# Patient Record
Sex: MALE | Race: WHITE | Employment: FULL TIME | URBAN - METROPOLITAN AREA
[De-identification: names, ages, dates, MRNs, and addresses within clinical notes are randomized per-mention and may not be internally consistent; named-entity substitution may affect disease eponyms.]

---

## 2017-01-07 ENCOUNTER — HOSPITAL ENCOUNTER (EMERGENCY)
Age: 22
Discharge: HOME | End: 2017-01-07
Attending: EMERGENCY MEDICINE
Payer: COMMERCIAL

## 2017-01-07 ENCOUNTER — APPOINTMENT (OUTPATIENT)
Dept: GENERAL RADIOLOGY | Age: 22
End: 2017-01-07
Payer: COMMERCIAL

## 2017-01-07 VITALS
BODY MASS INDEX: 20.09 KG/M2 | OXYGEN SATURATION: 100 % | HEART RATE: 60 BPM | RESPIRATION RATE: 16 BRPM | WEIGHT: 140 LBS | TEMPERATURE: 97.7 F | DIASTOLIC BLOOD PRESSURE: 77 MMHG | SYSTOLIC BLOOD PRESSURE: 111 MMHG

## 2017-01-07 DIAGNOSIS — M54.6 PAIN IN THORACIC SPINE: Primary | ICD-10-CM

## 2017-01-07 LAB — D DIMER: <0.19 MG/L FEU (ref 0–0.5)

## 2017-01-07 PROCEDURE — 71020 XR CHEST STANDARD TWO VW: CPT

## 2017-01-07 PROCEDURE — 36415 COLL VENOUS BLD VENIPUNCTURE: CPT

## 2017-01-07 PROCEDURE — 85379 FIBRIN DEGRADATION QUANT: CPT

## 2017-01-07 PROCEDURE — 99285 EMERGENCY DEPT VISIT HI MDM: CPT

## 2017-01-07 PROCEDURE — 93005 ELECTROCARDIOGRAM TRACING: CPT

## 2017-01-07 RX ORDER — METHOCARBAMOL 750 MG/1
TABLET, FILM COATED ORAL
Qty: 25 TABLET | Refills: 0 | Status: SHIPPED | OUTPATIENT
Start: 2017-01-07 | End: 2017-01-13

## 2017-01-07 RX ORDER — ETODOLAC 400 MG/1
TABLET, FILM COATED ORAL
Qty: 25 TABLET | Refills: 0 | Status: SHIPPED | OUTPATIENT
Start: 2017-01-07 | End: 2017-10-06

## 2017-01-07 ASSESSMENT — ENCOUNTER SYMPTOMS
DIARRHEA: 0
VOICE CHANGE: 0
EYE ITCHING: 0
ANAL BLEEDING: 0
EYE DISCHARGE: 0
FACIAL SWELLING: 0
VOMITING: 0
PHOTOPHOBIA: 0
STRIDOR: 0
CHEST TIGHTNESS: 0
ABDOMINAL PAIN: 0
EYE PAIN: 0
BLOOD IN STOOL: 0
ABDOMINAL DISTENTION: 0
BACK PAIN: 0
SINUS PRESSURE: 0
SHORTNESS OF BREATH: 0
COUGH: 0
CONSTIPATION: 0
EYE REDNESS: 0
SORE THROAT: 0
COLOR CHANGE: 0
CHOKING: 0
WHEEZING: 0
TROUBLE SWALLOWING: 0
RHINORRHEA: 0
NAUSEA: 0

## 2017-01-07 ASSESSMENT — PAIN DESCRIPTION - LOCATION: LOCATION: BACK

## 2017-01-07 ASSESSMENT — PAIN DESCRIPTION - PAIN TYPE: TYPE: ACUTE PAIN

## 2017-01-07 ASSESSMENT — PAIN DESCRIPTION - FREQUENCY: FREQUENCY: CONTINUOUS

## 2017-01-07 ASSESSMENT — PAIN SCALES - GENERAL: PAINLEVEL_OUTOF10: 7

## 2017-01-09 LAB
EKG ATRIAL RATE: 72 BPM
EKG P AXIS: 73 DEGREES
EKG P-R INTERVAL: 136 MS
EKG Q-T INTERVAL: 362 MS
EKG QRS DURATION: 88 MS
EKG QTC CALCULATION (BAZETT): 396 MS
EKG R AXIS: 90 DEGREES
EKG T AXIS: 57 DEGREES
EKG VENTRICULAR RATE: 72 BPM

## 2017-01-13 ENCOUNTER — OFFICE VISIT (OUTPATIENT)
Dept: FAMILY MEDICINE CLINIC | Age: 22
End: 2017-01-13

## 2017-01-13 VITALS
DIASTOLIC BLOOD PRESSURE: 74 MMHG | SYSTOLIC BLOOD PRESSURE: 128 MMHG | HEART RATE: 63 BPM | BODY MASS INDEX: 20.9 KG/M2 | OXYGEN SATURATION: 98 % | WEIGHT: 145.98 LBS | HEIGHT: 70 IN

## 2017-01-13 DIAGNOSIS — M54.6 CHRONIC MIDLINE THORACIC BACK PAIN: ICD-10-CM

## 2017-01-13 DIAGNOSIS — G44.84 EXERTIONAL HEADACHE: ICD-10-CM

## 2017-01-13 DIAGNOSIS — K21.9 GASTROESOPHAGEAL REFLUX DISEASE, ESOPHAGITIS PRESENCE NOT SPECIFIED: ICD-10-CM

## 2017-01-13 DIAGNOSIS — I47.1 SVT (SUPRAVENTRICULAR TACHYCARDIA) (HCC): ICD-10-CM

## 2017-01-13 DIAGNOSIS — R07.89 ATYPICAL CHEST PAIN: Primary | ICD-10-CM

## 2017-01-13 DIAGNOSIS — G89.29 CHRONIC MIDLINE THORACIC BACK PAIN: ICD-10-CM

## 2017-01-13 PROCEDURE — 99213 OFFICE O/P EST LOW 20 MIN: CPT | Performed by: FAMILY MEDICINE

## 2017-01-13 RX ORDER — FAMOTIDINE 20 MG/1
20 TABLET, FILM COATED ORAL 2 TIMES DAILY
Qty: 60 TABLET | Refills: 3 | Status: SHIPPED | OUTPATIENT
Start: 2017-01-13 | End: 2017-10-06

## 2017-02-06 ENCOUNTER — OFFICE VISIT (OUTPATIENT)
Dept: FAMILY MEDICINE CLINIC | Age: 22
End: 2017-02-06

## 2017-02-06 VITALS
OXYGEN SATURATION: 97 % | TEMPERATURE: 97 F | WEIGHT: 140.8 LBS | DIASTOLIC BLOOD PRESSURE: 60 MMHG | HEART RATE: 115 BPM | SYSTOLIC BLOOD PRESSURE: 110 MMHG | HEIGHT: 70 IN | BODY MASS INDEX: 20.16 KG/M2

## 2017-02-06 DIAGNOSIS — J40 BRONCHITIS: Primary | ICD-10-CM

## 2017-02-06 PROCEDURE — 99213 OFFICE O/P EST LOW 20 MIN: CPT | Performed by: NURSE PRACTITIONER

## 2017-02-06 RX ORDER — ASPIRIN/CALCIUM/MAG/ALUMINUM 325 MG
TABLET ORAL
Status: ON HOLD | COMMUNITY
End: 2017-09-23 | Stop reason: HOSPADM

## 2017-02-06 RX ORDER — AZITHROMYCIN 250 MG/1
TABLET, FILM COATED ORAL
Qty: 1 PACKET | Refills: 0 | Status: SHIPPED | OUTPATIENT
Start: 2017-02-06 | End: 2017-02-16

## 2017-02-06 RX ORDER — BENZONATATE 100 MG/1
100 CAPSULE ORAL 3 TIMES DAILY PRN
Qty: 21 CAPSULE | Refills: 0 | Status: SHIPPED | OUTPATIENT
Start: 2017-02-06 | End: 2017-02-13

## 2017-02-06 ASSESSMENT — ENCOUNTER SYMPTOMS
NAUSEA: 1
SWOLLEN GLANDS: 1
COUGH: 1
ABDOMINAL PAIN: 1
WHEEZING: 1
SORE THROAT: 1
SINUS PRESSURE: 1
SINUS PAIN: 0
RHINORRHEA: 1
VOMITING: 0
DIARRHEA: 0

## 2017-04-25 ENCOUNTER — OFFICE VISIT (OUTPATIENT)
Dept: FAMILY MEDICINE CLINIC | Age: 22
End: 2017-04-25

## 2017-04-25 VITALS
HEART RATE: 95 BPM | OXYGEN SATURATION: 97 % | WEIGHT: 144 LBS | HEIGHT: 70 IN | BODY MASS INDEX: 20.62 KG/M2 | DIASTOLIC BLOOD PRESSURE: 78 MMHG | SYSTOLIC BLOOD PRESSURE: 116 MMHG

## 2017-04-25 DIAGNOSIS — S90.31XA CONTUSION OF RIGHT FOOT, INITIAL ENCOUNTER: Primary | ICD-10-CM

## 2017-04-25 PROCEDURE — 99213 OFFICE O/P EST LOW 20 MIN: CPT | Performed by: FAMILY MEDICINE

## 2017-04-25 ASSESSMENT — PATIENT HEALTH QUESTIONNAIRE - PHQ9
SUM OF ALL RESPONSES TO PHQ QUESTIONS 1-9: 0
2. FEELING DOWN, DEPRESSED OR HOPELESS: 0
1. LITTLE INTEREST OR PLEASURE IN DOING THINGS: 0
SUM OF ALL RESPONSES TO PHQ9 QUESTIONS 1 & 2: 0

## 2017-09-21 ENCOUNTER — APPOINTMENT (OUTPATIENT)
Dept: GENERAL RADIOLOGY | Age: 22
End: 2017-09-21
Payer: COMMERCIAL

## 2017-09-21 ENCOUNTER — HOSPITAL ENCOUNTER (EMERGENCY)
Age: 22
Discharge: HOME | End: 2017-09-21
Payer: COMMERCIAL

## 2017-09-21 ENCOUNTER — APPOINTMENT (OUTPATIENT)
Dept: CT IMAGING | Age: 22
End: 2017-09-21
Payer: COMMERCIAL

## 2017-09-21 VITALS
SYSTOLIC BLOOD PRESSURE: 130 MMHG | TEMPERATURE: 98.9 F | RESPIRATION RATE: 20 BRPM | BODY MASS INDEX: 20.04 KG/M2 | HEIGHT: 70 IN | DIASTOLIC BLOOD PRESSURE: 82 MMHG | WEIGHT: 140 LBS | HEART RATE: 112 BPM | OXYGEN SATURATION: 96 %

## 2017-09-21 DIAGNOSIS — R10.30 POSTOPERATIVE LOWER ABDOMINAL PAIN: Primary | ICD-10-CM

## 2017-09-21 DIAGNOSIS — G89.18 POSTOPERATIVE LOWER ABDOMINAL PAIN: Primary | ICD-10-CM

## 2017-09-21 LAB
ALBUMIN SERPL-MCNC: 4.8 G/DL (ref 3.9–4.9)
ALP BLD-CCNC: 60 U/L (ref 35–104)
ALT SERPL-CCNC: 13 U/L (ref 0–41)
AMPHETAMINE SCREEN, URINE: ABNORMAL
ANION GAP SERPL CALCULATED.3IONS-SCNC: 16 MEQ/L (ref 7–13)
AST SERPL-CCNC: 14 U/L (ref 0–40)
BARBITURATE SCREEN URINE: ABNORMAL
BASOPHILS ABSOLUTE: 0.1 K/UL (ref 0–0.2)
BASOPHILS RELATIVE PERCENT: 0.5 %
BENZODIAZEPINE SCREEN, URINE: ABNORMAL
BILIRUB SERPL-MCNC: 0.5 MG/DL (ref 0–1.2)
BILIRUBIN URINE: NEGATIVE
BLOOD, URINE: NEGATIVE
BUN BLDV-MCNC: 11 MG/DL (ref 6–20)
CALCIUM SERPL-MCNC: 9.5 MG/DL (ref 8.6–10.2)
CANNABINOID SCREEN URINE: ABNORMAL
CHLORIDE BLD-SCNC: 98 MEQ/L (ref 98–107)
CLARITY: CLEAR
CO2: 24 MEQ/L (ref 22–29)
COCAINE METABOLITE SCREEN URINE: ABNORMAL
COLOR: YELLOW
CREAT SERPL-MCNC: 0.96 MG/DL (ref 0.7–1.2)
EOSINOPHILS ABSOLUTE: 0.5 K/UL (ref 0–0.7)
EOSINOPHILS RELATIVE PERCENT: 3.8 %
GFR AFRICAN AMERICAN: >60
GFR NON-AFRICAN AMERICAN: >60
GLOBULIN: 3 G/DL (ref 2.3–3.5)
GLUCOSE BLD-MCNC: 96 MG/DL (ref 74–109)
GLUCOSE URINE: NEGATIVE MG/DL
HCT VFR BLD CALC: 46.6 % (ref 42–52)
HEMOGLOBIN: 15.6 G/DL (ref 14–18)
KETONES, URINE: NEGATIVE MG/DL
LEUKOCYTE ESTERASE, URINE: NEGATIVE
LYMPHOCYTES ABSOLUTE: 2.7 K/UL (ref 1–4.8)
LYMPHOCYTES RELATIVE PERCENT: 22.3 %
Lab: ABNORMAL
MCH RBC QN AUTO: 30.4 PG (ref 27–31.3)
MCHC RBC AUTO-ENTMCNC: 33.4 % (ref 33–37)
MCV RBC AUTO: 91 FL (ref 80–100)
MONOCYTES ABSOLUTE: 1.4 K/UL (ref 0.2–0.8)
MONOCYTES RELATIVE PERCENT: 11.2 %
NEUTROPHILS ABSOLUTE: 7.6 K/UL (ref 1.4–6.5)
NEUTROPHILS RELATIVE PERCENT: 62.2 %
NITRITE, URINE: NEGATIVE
OPIATE SCREEN URINE: POSITIVE
PDW BLD-RTO: 13.1 % (ref 11.5–14.5)
PH UA: 8 (ref 5–9)
PHENCYCLIDINE SCREEN URINE: ABNORMAL
PLATELET # BLD: 295 K/UL (ref 130–400)
POTASSIUM SERPL-SCNC: 4 MEQ/L (ref 3.5–5.1)
PROTEIN UA: NEGATIVE MG/DL
RBC # BLD: 5.12 M/UL (ref 4.7–6.1)
SODIUM BLD-SCNC: 138 MEQ/L (ref 132–144)
SPECIFIC GRAVITY UA: 1 (ref 1–1.03)
TOTAL PROTEIN: 7.8 G/DL (ref 6.4–8.1)
URINE REFLEX TO CULTURE: NORMAL
UROBILINOGEN, URINE: 0.2 E.U./DL
WBC # BLD: 12.2 K/UL (ref 4.5–11)

## 2017-09-21 PROCEDURE — 2580000003 HC RX 258: Performed by: PERSONAL EMERGENCY RESPONSE ATTENDANT

## 2017-09-21 PROCEDURE — 80053 COMPREHEN METABOLIC PANEL: CPT

## 2017-09-21 PROCEDURE — 74176 CT ABD & PELVIS W/O CONTRAST: CPT

## 2017-09-21 PROCEDURE — 99284 EMERGENCY DEPT VISIT MOD MDM: CPT

## 2017-09-21 PROCEDURE — 85025 COMPLETE CBC W/AUTO DIFF WBC: CPT

## 2017-09-21 PROCEDURE — 71010 XR CHEST PORTABLE: CPT

## 2017-09-21 PROCEDURE — 81003 URINALYSIS AUTO W/O SCOPE: CPT

## 2017-09-21 PROCEDURE — 96375 TX/PRO/DX INJ NEW DRUG ADDON: CPT

## 2017-09-21 PROCEDURE — 36415 COLL VENOUS BLD VENIPUNCTURE: CPT

## 2017-09-21 PROCEDURE — 96374 THER/PROPH/DIAG INJ IV PUSH: CPT

## 2017-09-21 PROCEDURE — 93005 ELECTROCARDIOGRAM TRACING: CPT

## 2017-09-21 PROCEDURE — 80307 DRUG TEST PRSMV CHEM ANLYZR: CPT

## 2017-09-21 PROCEDURE — 6360000002 HC RX W HCPCS: Performed by: PERSONAL EMERGENCY RESPONSE ATTENDANT

## 2017-09-21 RX ORDER — 0.9 % SODIUM CHLORIDE 0.9 %
1000 INTRAVENOUS SOLUTION INTRAVENOUS ONCE
Status: COMPLETED | OUTPATIENT
Start: 2017-09-21 | End: 2017-09-21

## 2017-09-21 RX ORDER — 0.9 % SODIUM CHLORIDE 0.9 %
1000 INTRAVENOUS SOLUTION INTRAVENOUS ONCE
Status: DISCONTINUED | OUTPATIENT
Start: 2017-09-21 | End: 2017-09-22 | Stop reason: HOSPADM

## 2017-09-21 RX ORDER — HYDROCODONE BITARTRATE AND ACETAMINOPHEN 5; 325 MG/1; MG/1
1-2 TABLET ORAL EVERY 6 HOURS PRN
Qty: 6 TABLET | Refills: 0 | Status: SHIPPED | OUTPATIENT
Start: 2017-09-21 | End: 2017-09-28

## 2017-09-21 RX ORDER — MORPHINE SULFATE 4 MG/ML
4 INJECTION, SOLUTION INTRAMUSCULAR; INTRAVENOUS ONCE
Status: COMPLETED | OUTPATIENT
Start: 2017-09-21 | End: 2017-09-21

## 2017-09-21 RX ORDER — ONDANSETRON 2 MG/ML
4 INJECTION INTRAMUSCULAR; INTRAVENOUS ONCE
Status: COMPLETED | OUTPATIENT
Start: 2017-09-21 | End: 2017-09-21

## 2017-09-21 RX ORDER — LORAZEPAM 2 MG/ML
1 INJECTION INTRAMUSCULAR ONCE
Status: COMPLETED | OUTPATIENT
Start: 2017-09-21 | End: 2017-09-21

## 2017-09-21 RX ADMIN — LORAZEPAM 1 MG: 2 INJECTION INTRAMUSCULAR; INTRAVENOUS at 21:34

## 2017-09-21 RX ADMIN — SODIUM CHLORIDE 1000 ML: 9 INJECTION, SOLUTION INTRAVENOUS at 21:14

## 2017-09-21 RX ADMIN — Medication 4 MG: at 21:14

## 2017-09-21 RX ADMIN — ONDANSETRON 4 MG: 2 INJECTION INTRAMUSCULAR; INTRAVENOUS at 21:13

## 2017-09-21 ASSESSMENT — ENCOUNTER SYMPTOMS
DIARRHEA: 0
BLOOD IN STOOL: 0
COUGH: 0
NAUSEA: 1
SORE THROAT: 0
RHINORRHEA: 0
SHORTNESS OF BREATH: 0
ABDOMINAL PAIN: 1
VOMITING: 0
COLOR CHANGE: 0

## 2017-09-21 ASSESSMENT — PAIN DESCRIPTION - LOCATION
LOCATION: ABDOMEN
LOCATION: ABDOMEN

## 2017-09-21 ASSESSMENT — PAIN DESCRIPTION - DESCRIPTORS
DESCRIPTORS: CONSTANT;SHARP;SHOOTING
DESCRIPTORS: CONSTANT

## 2017-09-21 ASSESSMENT — PAIN DESCRIPTION - PAIN TYPE: TYPE: ACUTE PAIN

## 2017-09-21 ASSESSMENT — PAIN DESCRIPTION - FREQUENCY: FREQUENCY: CONTINUOUS

## 2017-09-21 ASSESSMENT — PAIN SCALES - GENERAL
PAINLEVEL_OUTOF10: 7
PAINLEVEL_OUTOF10: 9
PAINLEVEL_OUTOF10: 8

## 2017-09-21 ASSESSMENT — PAIN DESCRIPTION - ORIENTATION
ORIENTATION: RIGHT
ORIENTATION: LOWER;RIGHT

## 2017-09-21 ASSESSMENT — PAIN DESCRIPTION - ONSET: ONSET: ON-GOING

## 2017-09-22 ENCOUNTER — APPOINTMENT (OUTPATIENT)
Dept: ULTRASOUND IMAGING | Age: 22
End: 2017-09-22
Payer: COMMERCIAL

## 2017-09-22 ENCOUNTER — APPOINTMENT (OUTPATIENT)
Dept: CT IMAGING | Age: 22
End: 2017-09-22
Payer: COMMERCIAL

## 2017-09-22 ENCOUNTER — HOSPITAL ENCOUNTER (OUTPATIENT)
Age: 22
Setting detail: OBSERVATION
Discharge: HOME | End: 2017-09-23
Attending: INTERNAL MEDICINE | Admitting: INTERNAL MEDICINE
Payer: COMMERCIAL

## 2017-09-22 DIAGNOSIS — I97.630 POSTOPERATIVE HEMATOMA INVOLVING CIRCULATORY SYSTEM FOLLOWING CARDIAC CATHETERIZATION: Primary | ICD-10-CM

## 2017-09-22 PROBLEM — R10.31 SEVERE RIGHT GROIN PAIN: Status: ACTIVE | Noted: 2017-09-22

## 2017-09-22 LAB
ANION GAP SERPL CALCULATED.3IONS-SCNC: 18 MEQ/L (ref 7–13)
BASOPHILS ABSOLUTE: 0 K/UL (ref 0–0.2)
BASOPHILS RELATIVE PERCENT: 0.2 %
BILIRUBIN URINE: NEGATIVE
BLOOD, URINE: NEGATIVE
BUN BLDV-MCNC: 11 MG/DL (ref 6–20)
CALCIUM SERPL-MCNC: 9 MG/DL (ref 8.6–10.2)
CHLORIDE BLD-SCNC: 98 MEQ/L (ref 98–107)
CLARITY: CLEAR
CO2: 22 MEQ/L (ref 22–29)
COLOR: YELLOW
CREAT SERPL-MCNC: 0.95 MG/DL (ref 0.7–1.2)
EKG ATRIAL RATE: 112 BPM
EKG P AXIS: 69 DEGREES
EKG P-R INTERVAL: 118 MS
EKG Q-T INTERVAL: 318 MS
EKG QRS DURATION: 84 MS
EKG QTC CALCULATION (BAZETT): 434 MS
EKG R AXIS: 93 DEGREES
EKG T AXIS: 58 DEGREES
EKG VENTRICULAR RATE: 112 BPM
EOSINOPHILS ABSOLUTE: 0 K/UL (ref 0–0.7)
EOSINOPHILS RELATIVE PERCENT: 1.9 %
GFR AFRICAN AMERICAN: >60
GFR NON-AFRICAN AMERICAN: >60
GLUCOSE BLD-MCNC: 96 MG/DL (ref 74–109)
GLUCOSE URINE: NEGATIVE MG/DL
HCT VFR BLD CALC: 44 % (ref 42–52)
HEMOGLOBIN: 14.5 G/DL (ref 14–18)
KETONES, URINE: NEGATIVE MG/DL
LEUKOCYTE ESTERASE, URINE: NEGATIVE
LYMPHOCYTES ABSOLUTE: 1.4 K/UL (ref 1–4.8)
LYMPHOCYTES RELATIVE PERCENT: 10 %
MCH RBC QN AUTO: 30 PG (ref 27–31.3)
MCHC RBC AUTO-ENTMCNC: 32.9 % (ref 33–37)
MCV RBC AUTO: 91.3 FL (ref 80–100)
MONOCYTES ABSOLUTE: 1 K/UL (ref 0.2–0.8)
MONOCYTES RELATIVE PERCENT: 6.6 %
NEUTROPHILS ABSOLUTE: 11.5 K/UL (ref 1.4–6.5)
NEUTROPHILS RELATIVE PERCENT: 83 %
NITRITE, URINE: NEGATIVE
PDW BLD-RTO: 13.1 % (ref 11.5–14.5)
PH UA: 7.5 (ref 5–9)
PLATELET # BLD: 254 K/UL (ref 130–400)
PLATELET SLIDE REVIEW: NORMAL
POTASSIUM SERPL-SCNC: 4.1 MEQ/L (ref 3.5–5.1)
PROTEIN UA: NEGATIVE MG/DL
RBC # BLD: 4.81 M/UL (ref 4.7–6.1)
RBC # BLD: NORMAL 10*6/UL
SODIUM BLD-SCNC: 138 MEQ/L (ref 132–144)
SPECIFIC GRAVITY UA: 1.02 (ref 1–1.03)
UROBILINOGEN, URINE: 0.2 E.U./DL
WBC # BLD: 13.8 K/UL (ref 4.5–11)

## 2017-09-22 PROCEDURE — 93010 ELECTROCARDIOGRAM REPORT: CPT | Performed by: INTERNAL MEDICINE

## 2017-09-22 PROCEDURE — 2580000003 HC RX 258: Performed by: INTERNAL MEDICINE

## 2017-09-22 PROCEDURE — 6360000004 HC RX CONTRAST MEDICATION: Performed by: RADIOLOGY

## 2017-09-22 PROCEDURE — 99285 EMERGENCY DEPT VISIT HI MDM: CPT

## 2017-09-22 PROCEDURE — G0378 HOSPITAL OBSERVATION PER HR: HCPCS

## 2017-09-22 PROCEDURE — 96375 TX/PRO/DX INJ NEW DRUG ADDON: CPT

## 2017-09-22 PROCEDURE — 93926 LOWER EXTREMITY STUDY: CPT

## 2017-09-22 PROCEDURE — 6360000002 HC RX W HCPCS: Performed by: NURSE PRACTITIONER

## 2017-09-22 PROCEDURE — 96374 THER/PROPH/DIAG INJ IV PUSH: CPT

## 2017-09-22 PROCEDURE — 74177 CT ABD & PELVIS W/CONTRAST: CPT

## 2017-09-22 PROCEDURE — 80048 BASIC METABOLIC PNL TOTAL CA: CPT

## 2017-09-22 PROCEDURE — 6360000002 HC RX W HCPCS: Performed by: INTERNAL MEDICINE

## 2017-09-22 PROCEDURE — 85025 COMPLETE CBC W/AUTO DIFF WBC: CPT

## 2017-09-22 PROCEDURE — 81003 URINALYSIS AUTO W/O SCOPE: CPT

## 2017-09-22 PROCEDURE — 36415 COLL VENOUS BLD VENIPUNCTURE: CPT

## 2017-09-22 PROCEDURE — 96361 HYDRATE IV INFUSION ADD-ON: CPT

## 2017-09-22 PROCEDURE — 2580000003 HC RX 258: Performed by: NURSE PRACTITIONER

## 2017-09-22 PROCEDURE — 2580000003 HC RX 258: Performed by: RADIOLOGY

## 2017-09-22 PROCEDURE — 96376 TX/PRO/DX INJ SAME DRUG ADON: CPT

## 2017-09-22 RX ORDER — ONDANSETRON 2 MG/ML
4 INJECTION INTRAMUSCULAR; INTRAVENOUS EVERY 6 HOURS PRN
Status: DISCONTINUED | OUTPATIENT
Start: 2017-09-22 | End: 2017-09-23 | Stop reason: HOSPADM

## 2017-09-22 RX ORDER — ONDANSETRON 2 MG/ML
4 INJECTION INTRAMUSCULAR; INTRAVENOUS EVERY 8 HOURS PRN
Status: DISCONTINUED | OUTPATIENT
Start: 2017-09-22 | End: 2017-09-22

## 2017-09-22 RX ORDER — ONDANSETRON 2 MG/ML
4 INJECTION INTRAMUSCULAR; INTRAVENOUS ONCE
Status: COMPLETED | OUTPATIENT
Start: 2017-09-22 | End: 2017-09-22

## 2017-09-22 RX ORDER — SODIUM CHLORIDE 0.9 % (FLUSH) 0.9 %
10 SYRINGE (ML) INJECTION PRN
Status: DISCONTINUED | OUTPATIENT
Start: 2017-09-22 | End: 2017-09-23 | Stop reason: HOSPADM

## 2017-09-22 RX ORDER — SODIUM CHLORIDE 0.9 % (FLUSH) 0.9 %
10 SYRINGE (ML) INJECTION ONCE
Status: COMPLETED | OUTPATIENT
Start: 2017-09-22 | End: 2017-09-22

## 2017-09-22 RX ORDER — 0.9 % SODIUM CHLORIDE 0.9 %
1000 INTRAVENOUS SOLUTION INTRAVENOUS ONCE
Status: COMPLETED | OUTPATIENT
Start: 2017-09-22 | End: 2017-09-22

## 2017-09-22 RX ORDER — FENTANYL CITRATE 50 UG/ML
50 INJECTION, SOLUTION INTRAMUSCULAR; INTRAVENOUS ONCE
Status: COMPLETED | OUTPATIENT
Start: 2017-09-22 | End: 2017-09-22

## 2017-09-22 RX ORDER — PANTOPRAZOLE SODIUM 40 MG/1
40 TABLET, DELAYED RELEASE ORAL
Status: DISCONTINUED | OUTPATIENT
Start: 2017-09-23 | End: 2017-09-23 | Stop reason: HOSPADM

## 2017-09-22 RX ORDER — SODIUM CHLORIDE 9 MG/ML
INJECTION, SOLUTION INTRAVENOUS CONTINUOUS
Status: DISCONTINUED | OUTPATIENT
Start: 2017-09-22 | End: 2017-09-23 | Stop reason: HOSPADM

## 2017-09-22 RX ORDER — DOCUSATE SODIUM 100 MG/1
100 CAPSULE, LIQUID FILLED ORAL 2 TIMES DAILY
Status: DISCONTINUED | OUTPATIENT
Start: 2017-09-22 | End: 2017-09-23 | Stop reason: HOSPADM

## 2017-09-22 RX ORDER — MORPHINE SULFATE 2 MG/ML
2 INJECTION, SOLUTION INTRAMUSCULAR; INTRAVENOUS
Status: DISCONTINUED | OUTPATIENT
Start: 2017-09-22 | End: 2017-09-22

## 2017-09-22 RX ORDER — FENTANYL CITRATE 50 UG/ML
50 INJECTION, SOLUTION INTRAMUSCULAR; INTRAVENOUS ONCE
Status: DISCONTINUED | OUTPATIENT
Start: 2017-09-22 | End: 2017-09-22

## 2017-09-22 RX ORDER — MORPHINE SULFATE 4 MG/ML
4 INJECTION, SOLUTION INTRAMUSCULAR; INTRAVENOUS
Status: DISCONTINUED | OUTPATIENT
Start: 2017-09-22 | End: 2017-09-22

## 2017-09-22 RX ORDER — SODIUM CHLORIDE 0.9 % (FLUSH) 0.9 %
10 SYRINGE (ML) INJECTION EVERY 12 HOURS SCHEDULED
Status: DISCONTINUED | OUTPATIENT
Start: 2017-09-22 | End: 2017-09-23 | Stop reason: HOSPADM

## 2017-09-22 RX ORDER — FENTANYL CITRATE 50 UG/ML
1 INJECTION, SOLUTION INTRAMUSCULAR; INTRAVENOUS EVERY 4 HOURS
Status: DISCONTINUED | OUTPATIENT
Start: 2017-09-22 | End: 2017-09-22

## 2017-09-22 RX ORDER — ACETAMINOPHEN 325 MG/1
650 TABLET ORAL EVERY 4 HOURS PRN
Status: DISCONTINUED | OUTPATIENT
Start: 2017-09-22 | End: 2017-09-23 | Stop reason: HOSPADM

## 2017-09-22 RX ADMIN — FENTANYL CITRATE 50 MCG: 50 INJECTION INTRAMUSCULAR; INTRAVENOUS at 17:18

## 2017-09-22 RX ADMIN — HYDROMORPHONE HYDROCHLORIDE 0.5 MG: 1 INJECTION, SOLUTION INTRAMUSCULAR; INTRAVENOUS; SUBCUTANEOUS at 21:43

## 2017-09-22 RX ADMIN — ONDANSETRON 4 MG: 2 INJECTION INTRAMUSCULAR; INTRAVENOUS at 21:32

## 2017-09-22 RX ADMIN — SODIUM CHLORIDE, PRESERVATIVE FREE 10 ML: 5 INJECTION INTRAVENOUS at 21:33

## 2017-09-22 RX ADMIN — SODIUM CHLORIDE, PRESERVATIVE FREE 10 ML: 5 INJECTION INTRAVENOUS at 18:00

## 2017-09-22 RX ADMIN — ONDANSETRON 4 MG: 2 INJECTION INTRAMUSCULAR; INTRAVENOUS at 17:18

## 2017-09-22 RX ADMIN — SODIUM CHLORIDE 1000 ML: 9 INJECTION, SOLUTION INTRAVENOUS at 17:18

## 2017-09-22 RX ADMIN — SODIUM CHLORIDE: 9 INJECTION, SOLUTION INTRAVENOUS at 21:29

## 2017-09-22 RX ADMIN — IOPAMIDOL 100 ML: 755 INJECTION, SOLUTION INTRAVENOUS at 17:59

## 2017-09-22 ASSESSMENT — ENCOUNTER SYMPTOMS
SHORTNESS OF BREATH: 0
COUGH: 0
ABDOMINAL PAIN: 1
VOMITING: 1
NAUSEA: 1
DIARRHEA: 0

## 2017-09-22 ASSESSMENT — PAIN SCALES - GENERAL
PAINLEVEL_OUTOF10: 7
PAINLEVEL_OUTOF10: 5
PAINLEVEL_OUTOF10: 7
PAINLEVEL_OUTOF10: 5

## 2017-09-22 ASSESSMENT — PAIN DESCRIPTION - LOCATION
LOCATION: ABDOMEN

## 2017-09-22 ASSESSMENT — PAIN DESCRIPTION - FREQUENCY: FREQUENCY: INTERMITTENT

## 2017-09-22 ASSESSMENT — PAIN DESCRIPTION - PAIN TYPE
TYPE: ACUTE PAIN
TYPE: ACUTE PAIN

## 2017-09-22 ASSESSMENT — PAIN DESCRIPTION - ORIENTATION: ORIENTATION: MID;RIGHT;LOWER

## 2017-09-23 VITALS
HEART RATE: 65 BPM | TEMPERATURE: 97.9 F | SYSTOLIC BLOOD PRESSURE: 109 MMHG | WEIGHT: 137.35 LBS | DIASTOLIC BLOOD PRESSURE: 68 MMHG | HEIGHT: 70 IN | RESPIRATION RATE: 16 BRPM | BODY MASS INDEX: 19.66 KG/M2 | OXYGEN SATURATION: 100 %

## 2017-09-23 LAB
ABO/RH: NORMAL
ANTIBODY SCREEN: NORMAL
HCT VFR BLD CALC: 40.8 % (ref 42–52)
HEMOGLOBIN: 13.3 G/DL (ref 14–18)
MCH RBC QN AUTO: 29.9 PG (ref 27–31.3)
MCHC RBC AUTO-ENTMCNC: 32.5 % (ref 33–37)
MCV RBC AUTO: 91.9 FL (ref 80–100)
PDW BLD-RTO: 13 % (ref 11.5–14.5)
PLATELET # BLD: 243 K/UL (ref 130–400)
RBC # BLD: 4.44 M/UL (ref 4.7–6.1)
WBC # BLD: 10.4 K/UL (ref 4.5–11)

## 2017-09-23 PROCEDURE — 86900 BLOOD TYPING SEROLOGIC ABO: CPT

## 2017-09-23 PROCEDURE — 86850 RBC ANTIBODY SCREEN: CPT

## 2017-09-23 PROCEDURE — 36415 COLL VENOUS BLD VENIPUNCTURE: CPT

## 2017-09-23 PROCEDURE — 6370000000 HC RX 637 (ALT 250 FOR IP): Performed by: INTERNAL MEDICINE

## 2017-09-23 PROCEDURE — 86901 BLOOD TYPING SEROLOGIC RH(D): CPT

## 2017-09-23 PROCEDURE — 85027 COMPLETE CBC AUTOMATED: CPT

## 2017-09-23 PROCEDURE — G0378 HOSPITAL OBSERVATION PER HR: HCPCS

## 2017-09-23 RX ORDER — METOCLOPRAMIDE 10 MG/1
10 TABLET ORAL ONCE
Status: COMPLETED | OUTPATIENT
Start: 2017-09-23 | End: 2017-09-23

## 2017-09-23 RX ADMIN — DOCUSATE SODIUM 100 MG: 100 CAPSULE, LIQUID FILLED ORAL at 09:22

## 2017-09-23 RX ADMIN — METOCLOPRAMIDE 10 MG: 10 TABLET ORAL at 09:51

## 2017-09-25 LAB
GFR AFRICAN AMERICAN: >60
GFR NON-AFRICAN AMERICAN: >60
PERFORMED ON: NORMAL
POC CREATININE: 1 MG/DL (ref 0.9–1.3)
POC SAMPLE TYPE: NORMAL

## 2017-09-27 ENCOUNTER — TELEPHONE (OUTPATIENT)
Dept: FAMILY MEDICINE CLINIC | Age: 22
End: 2017-09-27

## 2017-10-06 ENCOUNTER — OFFICE VISIT (OUTPATIENT)
Dept: FAMILY MEDICINE CLINIC | Age: 22
End: 2017-10-06

## 2017-10-06 VITALS
HEART RATE: 94 BPM | SYSTOLIC BLOOD PRESSURE: 98 MMHG | DIASTOLIC BLOOD PRESSURE: 60 MMHG | OXYGEN SATURATION: 98 % | BODY MASS INDEX: 19.76 KG/M2 | HEIGHT: 70 IN | WEIGHT: 138 LBS

## 2017-10-06 DIAGNOSIS — I47.1 PAROXYSMAL SVT (SUPRAVENTRICULAR TACHYCARDIA) (HCC): ICD-10-CM

## 2017-10-06 DIAGNOSIS — S30.1XXA GROIN HEMATOMA, INITIAL ENCOUNTER: Primary | ICD-10-CM

## 2017-10-06 DIAGNOSIS — Z86.79 S/P ABLATION OPERATION FOR ARRHYTHMIA: ICD-10-CM

## 2017-10-06 DIAGNOSIS — Z98.890 S/P ABLATION OPERATION FOR ARRHYTHMIA: ICD-10-CM

## 2017-10-06 PROCEDURE — 99495 TRANSJ CARE MGMT MOD F2F 14D: CPT | Performed by: FAMILY MEDICINE

## 2017-10-06 NOTE — PROGRESS NOTES
W IV CONTRAST Additional Contrast? None     Standing Status:   Future     Standing Expiration Date:   10/6/2018     Order Specific Question:   Additional Contrast?     Answer:   None     No orders of the defined types were placed in this encounter.       F/u CT to assure resolving hematoma        Note:  CPT Coding - 86402 (Moderate level - seen within 14 days)          Electronically signed by Gumaro Mejía MD on 10/6/2017 at 4:47 PM

## 2017-10-06 NOTE — MR AVS SNAPSHOT
Allergies              Bee Venom Other (See Comments)    Localized reaction    Beesix [Pyridoxine Hcl]     Other     Beta blocker intolerance does take \"prn\"         Additional Information        Basic Information     Date Of Birth Sex Race Ethnicity Preferred Language    1995 Male White Non-/Non  English      Problem List as of 10/6/2017  Date Reviewed: 2/6/2017                Severe right groin pain    Exertional headache    Paroxysmal SVT (supraventricular tachycardia) (HCC)    Thumb fracture    Fitting and adjustment of orthopedic device      Preventive Care        Date Due    HIV screening is recommended for all people regardless of risk factors  aged 15-65 years at least once (lifetime) who have never been HIV tested. 1/5/2012    Meningococcal Vaccine (1 of 1) 1/5/2013    Tetanus Combination Vaccine (1 - Tdap) 1/5/2016    Yearly Flu Vaccine (1) 1/13/2018 (Originally 9/1/2017)            [address] [place] [address] records indicate that you have declined MyChart signup.

## 2018-11-10 ENCOUNTER — HOSPITAL ENCOUNTER (EMERGENCY)
Age: 23
Discharge: HOME | End: 2018-11-10

## 2018-11-10 VITALS
OXYGEN SATURATION: 97 % | RESPIRATION RATE: 15 BRPM | DIASTOLIC BLOOD PRESSURE: 81 MMHG | WEIGHT: 150 LBS | TEMPERATURE: 98.3 F | HEIGHT: 70 IN | SYSTOLIC BLOOD PRESSURE: 126 MMHG | HEART RATE: 82 BPM | BODY MASS INDEX: 21.47 KG/M2

## 2018-11-10 DIAGNOSIS — H61.23 BILATERAL IMPACTED CERUMEN: Primary | ICD-10-CM

## 2018-11-10 LAB — STREP A AG, THROAT, POCT: NEGATIVE

## 2018-11-10 PROCEDURE — 99283 EMERGENCY DEPT VISIT LOW MDM: CPT

## 2018-11-10 ASSESSMENT — PAIN DESCRIPTION - LOCATION: LOCATION: EAR;THROAT

## 2018-11-10 ASSESSMENT — ENCOUNTER SYMPTOMS
EYES NEGATIVE: 1
GASTROINTESTINAL NEGATIVE: 1
RESPIRATORY NEGATIVE: 1

## 2018-11-10 ASSESSMENT — PAIN DESCRIPTION - ORIENTATION: ORIENTATION: LEFT

## 2018-11-10 ASSESSMENT — PAIN DESCRIPTION - PAIN TYPE: TYPE: ACUTE PAIN

## 2018-11-10 NOTE — ED PROVIDER NOTES
 Drug use: No    Sexual activity: Not Asked     Other Topics Concern    None     Social History Narrative    None       SCREENINGS      @FLOW([id number])@      PHYSICAL EXAM    (up to 7 for level 4, 8 or more for level 5)     ED Triage Vitals [11/10/18 1829]   BP Temp Temp Source Pulse Resp SpO2 Height Weight   126/81 98.3 °F (36.8 °C) Oral 82 15 97 % 5' 10\" (1.778 m) 150 lb (68 kg)       Physical Exam   Constitutional: He is oriented to person, place, and time. He appears well-developed and well-nourished. No distress. HENT:   Head: Normocephalic and atraumatic. Bilateral cerumen impaction left greater than right   Eyes: Pupils are equal, round, and reactive to light. Conjunctivae are normal.   Neck: Normal range of motion. Neck supple. Cardiovascular: Normal rate and regular rhythm. No murmur heard. Pulmonary/Chest: Breath sounds normal. No respiratory distress. He has no wheezes. He has no rales. Abdominal: Soft. He exhibits no distension. There is no tenderness. Musculoskeletal: Normal range of motion. Neurological: He is alert and oriented to person, place, and time. No cranial nerve deficit. Skin: Skin is warm and dry. No rash noted. No erythema. Psychiatric: He has a normal mood and affect. Judgment normal.         All other labs were within normal range or not returned as of this dictation. EMERGENCY DEPARTMENT COURSE and DIFFERENTIALDIAGNOSIS/MDM:   Vitals:    Vitals:    11/10/18 1829   BP: 126/81   Pulse: 82   Resp: 15   Temp: 98.3 °F (36.8 °C)   TempSrc: Oral   SpO2: 97%   Weight: 150 lb (68 kg)   Height: 5' 10\" (1.778 m)          Strep test is negative. Patient will be placed on Debrox ear drops. Patient is to use these drops over the next 4 days and follow-up with ear nose and throat specialist for reevaluation treatment. Patient verbalizes understanding of discharge and as no further questions.       PROCEDURES:  Unless otherwise noted below, none     Procedures      FINAL

## 2021-06-04 ENCOUNTER — OFFICE VISIT (OUTPATIENT)
Dept: FAMILY MEDICINE CLINIC | Age: 26
End: 2021-06-04
Payer: COMMERCIAL

## 2021-06-04 VITALS
TEMPERATURE: 98.5 F | HEART RATE: 118 BPM | HEIGHT: 70 IN | DIASTOLIC BLOOD PRESSURE: 74 MMHG | WEIGHT: 165 LBS | BODY MASS INDEX: 23.62 KG/M2 | OXYGEN SATURATION: 97 % | SYSTOLIC BLOOD PRESSURE: 110 MMHG

## 2021-06-04 DIAGNOSIS — R10.9 INTESTINAL CRAMPS: ICD-10-CM

## 2021-06-04 DIAGNOSIS — R10.13 DYSPEPSIA: ICD-10-CM

## 2021-06-04 DIAGNOSIS — L30.1 DYSHIDROTIC ECZEMA: Primary | ICD-10-CM

## 2021-06-04 DIAGNOSIS — K62.89 RECTAL IRRITATION: ICD-10-CM

## 2021-06-04 LAB
ALBUMIN SERPL-MCNC: 5 G/DL (ref 3.5–4.6)
ALP BLD-CCNC: 73 U/L (ref 35–104)
ALT SERPL-CCNC: <5 U/L (ref 0–41)
ANION GAP SERPL CALCULATED.3IONS-SCNC: 10 MEQ/L (ref 9–15)
AST SERPL-CCNC: 25 U/L (ref 0–40)
BILIRUB SERPL-MCNC: 0.4 MG/DL (ref 0.2–0.7)
BUN BLDV-MCNC: 13 MG/DL (ref 6–20)
CALCIUM SERPL-MCNC: 9.8 MG/DL (ref 8.5–9.9)
CHLORIDE BLD-SCNC: 102 MEQ/L (ref 95–107)
CO2: 28 MEQ/L (ref 20–31)
CREAT SERPL-MCNC: 0.97 MG/DL (ref 0.7–1.2)
GFR AFRICAN AMERICAN: >60
GFR NON-AFRICAN AMERICAN: >60
GLOBULIN: 3 G/DL (ref 2.3–3.5)
GLUCOSE BLD-MCNC: 106 MG/DL (ref 70–99)
HCT VFR BLD CALC: 47.6 % (ref 42–52)
HEMOGLOBIN: 16 G/DL (ref 14–18)
MCH RBC QN AUTO: 31.1 PG (ref 27–31.3)
MCHC RBC AUTO-ENTMCNC: 33.6 % (ref 33–37)
MCV RBC AUTO: 92.4 FL (ref 80–100)
PDW BLD-RTO: 13.3 % (ref 11.5–14.5)
PLATELET # BLD: 344 K/UL (ref 130–400)
POTASSIUM SERPL-SCNC: 4.4 MEQ/L (ref 3.4–4.9)
RBC # BLD: 5.15 M/UL (ref 4.7–6.1)
SODIUM BLD-SCNC: 140 MEQ/L (ref 135–144)
TOTAL PROTEIN: 8 G/DL (ref 6.3–8)
WBC # BLD: 7.2 K/UL (ref 4.8–10.8)

## 2021-06-04 PROCEDURE — 99203 OFFICE O/P NEW LOW 30 MIN: CPT | Performed by: FAMILY MEDICINE

## 2021-06-04 RX ORDER — FLUOCINONIDE 0.5 MG/G
OINTMENT TOPICAL
Qty: 60 G | Refills: 1 | Status: SHIPPED | OUTPATIENT
Start: 2021-06-04 | End: 2021-06-11

## 2021-06-04 RX ORDER — HYDROCORTISONE 25 MG/G
CREAM TOPICAL
Qty: 28 G | Refills: 0 | Status: SHIPPED | OUTPATIENT
Start: 2021-06-04 | End: 2022-07-25

## 2021-06-04 RX ORDER — DICYCLOMINE HYDROCHLORIDE 10 MG/1
10 CAPSULE ORAL 3 TIMES DAILY PRN
Qty: 90 CAPSULE | Refills: 3 | Status: SHIPPED | OUTPATIENT
Start: 2021-06-04 | End: 2022-07-25 | Stop reason: ALTCHOICE

## 2021-06-04 SDOH — ECONOMIC STABILITY: TRANSPORTATION INSECURITY
IN THE PAST 12 MONTHS, HAS LACK OF TRANSPORTATION KEPT YOU FROM MEETINGS, WORK, OR FROM GETTING THINGS NEEDED FOR DAILY LIVING?: NO

## 2021-06-04 SDOH — ECONOMIC STABILITY: FOOD INSECURITY: WITHIN THE PAST 12 MONTHS, YOU WORRIED THAT YOUR FOOD WOULD RUN OUT BEFORE YOU GOT MONEY TO BUY MORE.: NEVER TRUE

## 2021-06-04 SDOH — ECONOMIC STABILITY: FOOD INSECURITY: WITHIN THE PAST 12 MONTHS, THE FOOD YOU BOUGHT JUST DIDN'T LAST AND YOU DIDN'T HAVE MONEY TO GET MORE.: NEVER TRUE

## 2021-06-04 SDOH — ECONOMIC STABILITY: TRANSPORTATION INSECURITY
IN THE PAST 12 MONTHS, HAS THE LACK OF TRANSPORTATION KEPT YOU FROM MEDICAL APPOINTMENTS OR FROM GETTING MEDICATIONS?: NO

## 2021-06-04 ASSESSMENT — PATIENT HEALTH QUESTIONNAIRE - PHQ9
SUM OF ALL RESPONSES TO PHQ QUESTIONS 1-9: 0
1. LITTLE INTEREST OR PLEASURE IN DOING THINGS: 0
SUM OF ALL RESPONSES TO PHQ9 QUESTIONS 1 & 2: 0
SUM OF ALL RESPONSES TO PHQ QUESTIONS 1-9: 0
2. FEELING DOWN, DEPRESSED OR HOPELESS: 0
SUM OF ALL RESPONSES TO PHQ QUESTIONS 1-9: 0

## 2021-06-04 ASSESSMENT — SOCIAL DETERMINANTS OF HEALTH (SDOH): HOW HARD IS IT FOR YOU TO PAY FOR THE VERY BASICS LIKE FOOD, HOUSING, MEDICAL CARE, AND HEATING?: NOT HARD AT ALL

## 2021-06-04 NOTE — PROGRESS NOTES
Chief Complaint   Patient presents with    Annual Exam     check up, dry skin on hands and feet       HPI:  Emilia Hernández is a 27 y.o. male     Chronic dry skin on top of toes   Some on palms     Stomach issues ongoing  Had in past  Gas/loose stools  Not sure which foods are main cause    Rectal itching/irritation    Hasn't had any problems with his heart    Was without insurance for a while    Patient Active Problem List   Diagnosis    Thumb fracture    Fitting and adjustment of orthopedic device    [facility]    Exertional headache    Severe right groin pain       Current Outpatient Medications   Medication Sig Dispense Refill    fluocinonide (LIDEX) 0.05 % ointment Apply topically 2 times daily. 60 g 1    dicyclomine (BENTYL) 10 MG capsule Take 1 capsule by mouth 3 times daily as needed (intestinal cramp) 90 capsule 3    hydrocortisone (ANUSOL-HC) 2.5 % CREA rectal cream Apply to rectal area BID 28 g 0     No current facility-administered medications for this visit. Past Medical History:   Diagnosis Date    Anxiety     IBS (irritable bowel syndrome)     SVT (supraventricular tachycardia) ([facility] [address] 5/2/2016    Thumb fracture     saw [facility]     History reviewed. No pertinent surgical history.   Family History   Problem Relation Age of Onset    Diabetes Maternal Grandmother     Diabetes Maternal Grandfather     Diabetes Paternal Grandmother     Diabetes Paternal Grandfather      Social History     Socioeconomic History    Marital status: Single     Spouse name: None    Number of children: None    Years of education: None    Highest education level: None   Occupational History    None   Tobacco Use    Smoking status: Never Smoker    Smokeless tobacco: Never Used   Substance and Sexual Activity    Alcohol use: No    Drug use: No    Sexual activity: None   Other Topics Concern    None   Social History Narrative    None     Social Determinants of Health     Financial Resource Strain: Low Risk     Difficulty of Paying Living Expenses: Not hard at all   Food Insecurity: No Food Insecurity    Worried About Running Out of Food in the Last Year: Never true    [place] of Food in the Last Year: Never true   Transportation Needs: No Transportation Needs    Lack of Transportation (Medical): No    Lack of Transportation (Non-Medical): No   Physical Activity:     Days of Exercise per Week:     Minutes of Exercise per Session:    Stress:     Feeling of Stress :    Social Connections:     Frequency of Communication with Friends and Family:     Frequency of Social Gatherings with Friends and Family:     Attends Cheondoism Services:     Active Member of Clubs or Organizations:     Attends Club or Organization Meetings:     Marital Status:    Intimate Partner Violence:     Fear of Current or Ex-Partner:     Emotionally Abused:     Physically Abused:     Sexually Abused: Allergies   Allergen Reactions    Bee Venom Other (See Comments)     Localized reaction    Beesix [Pyridoxine Hcl]     Other      Beta blocker intolerance does take \"prn\"       Review of Systems:   General ROS: negative for - chills, fatigue, fever, malaise, weight gain or weight loss  Respiratory ROS: no cough, shortness of breath, or wheezing  Cardiovascular ROS: hx cardiac ablation  Gastrointestinal ROS: gas, loose bowels   Genito-Urinary ROS: no dysuria, trouble voiding  Musculoskeletal ROS: negative for - gait disturbance, joint pain or joint stiffness  Neurological ROS: negative for - behavioral changes, memory loss, numbness/tingling, tremors or weakness    In general patient otherwise reports feeling well.      Physical Exam:  /74 (Site: Left Upper Arm)   Pulse 118   Temp 98.5 °F (36.9 °C)   Ht 5' 10\" (1.778 m)   Wt 165 lb (74.8 kg)   SpO2 97%   BMI 23.68 kg/m²     Gen: Well, NAD, Alert, Oriented x 3   HEENT: EOMI, eyes clear, MMM  Skin: dyshidrotic eczema on hands/feet   Neck: no significant lymphadenopathy or thyromegaly  Lungs: CTA B w/out Rales/Wheezes/Rhonchi, Good respiratory effort   Heart: RRR, S1S2, w/out M/R/G, non-displaced PMI   Abdomen: Soft NT/ND, w/out R/G, w/ +BSx4   Ext: No C/C/E Bilaterally. Neuro: Neurovascularly intact w/ Sensory/Motor intact UE/LE Bilaterally. Lab Results   Component Value Date    WBC 10.4 09/23/2017    HGB 13.3 (L) 09/23/2017    HCT 40.8 (L) 09/23/2017     09/23/2017    CHOL 132 09/19/2014    TRIG 64 09/19/2014    HDL 48 09/19/2014    ALT 13 09/21/2017    AST 14 09/21/2017     09/22/2017    K 4.1 09/22/2017    CL 98 09/22/2017    CREATININE 1.0 09/22/2017    BUN 11 09/22/2017    CO2 22 09/22/2017    TSH 2.080 09/19/2014    INR 1.1 09/19/2014    LABA1C 5.6 12/10/2013         A&P   Diagnosis Orders   1. Dyshidrotic eczema  fluocinonide (LIDEX) 0.05 % ointment   2. Dyspepsia  Comprehensive Metabolic Panel    CBC    Common Food Allergen Profile    Celiac Reflex Panel   3. Intestinal cramps  dicyclomine (BENTYL) 10 MG capsule    Comprehensive Metabolic Panel    CBC    Common Food Allergen Profile    Celiac Reflex Panel   4.  Rectal irritation  hydrocortisone (ANUSOL-HC) 2.5 % CREA rectal cream       Labs as ordered    Lidex    proctosol    Should have f/u with cardiology       Gera Fuentes MD

## 2021-06-08 LAB
IGA: 170 MG/DL (ref 68–408)
TISSUE TRANSGLUTAMINASE IGA: <2 U/ML (ref 0–3)

## 2021-06-10 LAB
ALLERGEN BARLEY IGE: <0.1 KU/L
ALLERGEN BEEF: <0.1 KU/L
ALLERGEN CABBAGE IGE: <0.1 KU/L
ALLERGEN CARROT IGE: <0.1 KU/L
ALLERGEN CHICKEN IGE: <0.1 KU/L
ALLERGEN CODFISH IGE: <0.1 KU/L
ALLERGEN CORN IGE: <0.1 KU/L
ALLERGEN COW MILK IGE: <0.1 KU/L
ALLERGEN CRAB IGE: <0.1 KU/L
ALLERGEN EGG WHITE IGE: <0.1 KU/L
ALLERGEN GRAPE IGE: <0.1 KU/L
ALLERGEN LETTUCE IGE: <0.1 KU/L
ALLERGEN NAVY BEAN: <0.1 KU/L
ALLERGEN OAT: <0.1 KU/L
ALLERGEN ORANGE IGE: <0.1 KU/L
ALLERGEN PEANUT (F13) IGE: <0.1 KU/L
ALLERGEN PEPPER C. ANNUUM IGE: <0.1 KU/L
ALLERGEN PORK: <0.1 KU/L
ALLERGEN RICE IGE: <0.1 KU/L
ALLERGEN RYE IGE: <0.1 KU/L
ALLERGEN SOYBEAN IGE: <0.1 KU/L
ALLERGEN TOMATO IGE: <0.1 KU/L
ALLERGEN TUNA IGE: <0.1 KU/L
ALLERGEN WHEAT IGE: <0.1 KU/L
IGE: 86 IU/ML
POTATO, IGE: <0.1 KU/L
SHRIMP: <0.1 KU/L

## 2022-01-19 ENCOUNTER — OFFICE VISIT (OUTPATIENT)
Dept: FAMILY MEDICINE CLINIC | Age: 27
End: 2022-01-19
Payer: COMMERCIAL

## 2022-01-19 VITALS
BODY MASS INDEX: 24.71 KG/M2 | SYSTOLIC BLOOD PRESSURE: 116 MMHG | DIASTOLIC BLOOD PRESSURE: 80 MMHG | OXYGEN SATURATION: 98 % | HEIGHT: 70 IN | WEIGHT: 172.6 LBS | TEMPERATURE: 97 F | HEART RATE: 113 BPM

## 2022-01-19 DIAGNOSIS — R41.840 CONCENTRATION DEFICIT: Primary | ICD-10-CM

## 2022-01-19 PROCEDURE — G8427 DOCREV CUR MEDS BY ELIG CLIN: HCPCS | Performed by: FAMILY MEDICINE

## 2022-01-19 PROCEDURE — G8484 FLU IMMUNIZE NO ADMIN: HCPCS | Performed by: FAMILY MEDICINE

## 2022-01-19 PROCEDURE — 99213 OFFICE O/P EST LOW 20 MIN: CPT | Performed by: FAMILY MEDICINE

## 2022-01-19 PROCEDURE — G8420 CALC BMI NORM PARAMETERS: HCPCS | Performed by: FAMILY MEDICINE

## 2022-01-19 PROCEDURE — 1036F TOBACCO NON-USER: CPT | Performed by: FAMILY MEDICINE

## 2022-01-19 RX ORDER — ATOMOXETINE 40 MG/1
40 CAPSULE ORAL 2 TIMES DAILY
Qty: 60 CAPSULE | Refills: 3 | Status: SHIPPED | OUTPATIENT
Start: 2022-01-19 | End: 2022-04-18

## 2022-01-19 NOTE — PROGRESS NOTES
Chief Complaint   Patient presents with   Mallory Morales Other     has had focus issues for a while, unable to discuss when younger du to mother against it, affecting job and relationship    Wound Check     spot on foot that hasn't healed in 4 yearsa       HPI:  Nohemi Jaeger is a 24 y.o. male     Trouble focusing for some time  Never on meds as kid because mother did not believe in stimulants   Reports issues through middle school and high school   Girlfriend encouraged him to make appt     Banker, life insurance  Easily side-tracked   Forgetting things constantly     Has a list of things  Can't turn mind off  Trouble sleeping  Irritable next day   Waiting for a break, never comes  \"suffering silently\"   When he was younger, homework that should take 30 minutes would take 3 hours    Tried no blue light  Tried exercise  Has multiple sticky notes everywhere   Tries strict routine, which does help some   Tried putting phone down, deleting apps    Anxious at times because unable to complete tasks  Denies depression     Wanted me to check lesion on right foot       Patient Active Problem List   Diagnosis    Thumb fracture    Fitting and adjustment of orthopedic device    Parox[facility]mal [facility]    Exertional headache    Severe right groin pain       Current Outpatient Medications   Medication Sig Dispense Refill    atomoxetine (STRATTERA) 40 MG capsule Take 1 capsule by mouth 2 times daily 60 capsule 3    dicyclomine (BENTYL) 10 MG capsule Take 1 capsule by mouth 3 times daily as needed (intestinal cramp) 90 capsule 3    hydrocortisone (ANUSOL-HC) 2.5 % CREA rectal cream Apply to rectal area BID 28 g 0     No current facility-administered medications for this visit. Past Medical History:   Diagnosis Date    Anxiety     IBS (irritable bowel syndrome)     SVT [facility]supraventricular tachycardia) ([facility] [address] 5/2/2016    Thumb fracture     saw [facility]     History reviewed.  No pertinent surgical history. Family History   Problem Relation Age of Onset    Diabetes Maternal Grandmother     Diabetes Maternal Grandfather     Diabetes Paternal Grandmother     Diabetes Paternal Grandfather      Social History     Socioeconomic History    Marital status: Single     Spouse name: None    Number of children: None    Years of education: None    Highest education level: None   Occupational History    None   Tobacco Use    Smoking status: Never Smoker    Smokeless tobacco: Never Used   Substance and Sexual Activity    Alcohol use: No    Drug use: No    Sexual activity: None   Other Topics Concern    None   Social History Narrative    None     Social Determinants of Health     Financial Resource Strain: Low Risk     Difficulty of Paying Living Expenses: Not hard at all   Food Insecurity: No Food Insecurity    Worried About Running Out of Food in the Last Year: Never true    May[place] of Food in the Last Year: Never true   Transportation Needs: No Transportation Needs    Lack of Transportation (Medical): No    Lack of Transportation (Non-Medical):  No   Physical Activity:     Days of Exercise per Week: Not on file    Minutes of Exercise per Session: Not on file   Stress:     Feeling of Stress : Not on file   Social Connections:     Frequency of Communication with Friends and Family: Not on file    Frequency of Social Gatherings with Friends and Family: Not on file    Attends Hoahaoism Services: Not on file    Active Member of Clubs or Organizations: Not on file    Attends Club or Organization Meetings: Not on file    Marital Status: Not on file   Intimate Partner Violence:     Fear of Current or Ex-Partner: Not on file    Emotionally Abused: Not on file    Physically Abused: Not on file    Sexually Abused: Not on file   Housing Stability:     Unable to Pay for Housing in the Last Year: Not on file    Number of [place] in the Last Year: Not on file    Unstable Housing in the Last to reassess  Consider Master Corral MD

## 2022-01-20 NOTE — TELEPHONE ENCOUNTER
----- [facility] sent at 1/20/2022  1:11 PM EST -----  Subject: Medication Problem    QUESTIONS  Name of Medication? atomoxetine (STRATTERA) 40 MG capsule  Patient-reported dosage and instructions? 40 mg 1 capsule twice daily  What question or problem do you have with the medication? Requires prior   authorization; Script too expensive is $347.51; Obi Becerra spoke to a couple   different agents with ins co. He needs to know if he should wait to do   anything until he speaks to his psyc tomorrow and see what he wants to   prescribe something else  Preferred Pharmacy? [id number] - [place], [address] phone number (if available)? [phone]  Additional Information for Provider? Please call Obi Becerra to discuss before   prescribing another medication he want to insure it is covered. ---------------------------------------------------------------------------  --------------  Ashley MURCIA  What is the best way for the office to contact you? OK to leave message on   voicemail  Preferred Call Back Phone Number? [phone]  ---------------------------------------------------------------------------  --------------  SCRIPT ANSWERS  Relationship to Patient?  Self

## 2022-01-21 ENCOUNTER — VIRTUAL VISIT (OUTPATIENT)
Dept: BEHAVIORAL/MENTAL HEALTH CLINIC | Age: 27
End: 2022-01-21
Payer: COMMERCIAL

## 2022-01-21 DIAGNOSIS — F41.1 GENERALIZED ANXIETY DISORDER: Primary | ICD-10-CM

## 2022-01-21 PROCEDURE — 90791 PSYCH DIAGNOSTIC EVALUATION: CPT | Performed by: PSYCHOLOGIST

## 2022-01-21 ASSESSMENT — PATIENT HEALTH QUESTIONNAIRE - PHQ9
SUM OF ALL RESPONSES TO PHQ QUESTIONS 1-9: 17
SUM OF ALL RESPONSES TO PHQ QUESTIONS 1-9: 17
6. FEELING BAD ABOUT YOURSELF - OR THAT YOU ARE A FAILURE OR HAVE LET YOURSELF OR YOUR FAMILY DOWN: 1
SUM OF ALL RESPONSES TO PHQ QUESTIONS 1-9: 17
SUM OF ALL RESPONSES TO PHQ QUESTIONS 1-9: 17
8. MOVING OR SPEAKING SO SLOWLY THAT OTHER PEOPLE COULD HAVE NOTICED. OR THE OPPOSITE, BEING SO FIGETY OR RESTLESS THAT YOU HAVE BEEN MOVING AROUND A LOT MORE THAN USUAL: 1
3. TROUBLE FALLING OR STAYING ASLEEP: 3
SUM OF ALL RESPONSES TO PHQ9 QUESTIONS 1 & 2: 4
1. LITTLE INTEREST OR PLEASURE IN DOING THINGS: 3
4. FEELING TIRED OR HAVING LITTLE ENERGY: 3
9. THOUGHTS THAT YOU WOULD BE BETTER OFF DEAD, OR OF HURTING YOURSELF: 0
7. TROUBLE CONCENTRATING ON THINGS, SUCH AS READING THE NEWSPAPER OR WATCHING TELEVISION: 3
5. POOR APPETITE OR OVEREATING: 2
2. FEELING DOWN, DEPRESSED OR HOPELESS: 1

## 2022-01-21 ASSESSMENT — ANXIETY QUESTIONNAIRES
3. WORRYING TOO MUCH ABOUT DIFFERENT THINGS: 3-NEARLY EVERY DAY
7. FEELING AFRAID AS IF SOMETHING AWFUL MIGHT HAPPEN: 1-SEVERAL DAYS
GAD7 TOTAL SCORE: 18
4. TROUBLE RELAXING: 3-NEARLY EVERY DAY
5. BEING SO RESTLESS THAT IT IS HARD TO SIT STILL: 3-NEARLY EVERY DAY
2. NOT BEING ABLE TO STOP OR CONTROL WORRYING: 2-OVER HALF THE DAYS
1. FEELING NERVOUS, ANXIOUS, OR ON EDGE: 3-NEARLY EVERY DAY
6. BECOMING EASILY ANNOYED OR IRRITABLE: 3-NEARLY EVERY DAY

## 2022-01-27 ENCOUNTER — VIRTUAL VISIT (OUTPATIENT)
Dept: BEHAVIORAL/MENTAL HEALTH CLINIC | Age: 27
End: 2022-01-27
Payer: COMMERCIAL

## 2022-01-27 DIAGNOSIS — F41.1 GENERALIZED ANXIETY DISORDER: Primary | ICD-10-CM

## 2022-01-27 PROCEDURE — 90832 PSYTX W PT 30 MINUTES: CPT | Performed by: PSYCHOLOGIST

## 2022-01-27 NOTE — PROGRESS NOTES
Behavioral Health Consultation  Tyra Muniz, [address]. Psychologist  1/27/22  10:05 AM EST      Time spent with Patient: 25 minutes  This is patient's second  [facility] appointment. Reason for Consult:  stress and attention concerns  Referring Provider: No referring provider defined for this encounter. Feedback given to PCP. TELEHEALTH EVALUATION -- Audio and/or Visual (During [facility]- public health emergency)    Due to COVID 19 outbreak, patient's office visit was converted to a virtual visit. Patient was contacted and agreed to proceed with a virtual visit via Buz. Patient reports that they are located at home. Provider located at home office. The risks and benefits of converting to a virtual visit were discussed in light of the current infectious disease epidemic. Patient also understood that insurance coverage and co-pays are up to their individual insurance plans. Patient provides verbal consent for this visit to be billed to insurance. Pursuant to the emergency declaration under the [address] [place] [address], [facility] waiver authority and the [employer] and [employer] Act, this Virtual  Visit was conducted, with patient's consent, to reduce the patient's risk of exposure to COVID-19 and provide continuity of care for an established patient. Services were provided through an audio and video synchronous discussion virtually to substitute for in-person clinic visit. S:  Pt states that he did not  the [employer]. He did not want to try it, he states he researched it and did not want to take something that would take 3-6 weeks to work. Pt states that this has been an issue for a long time and it's really affecting him day to day at work. States that he has a lot of training to do at work and has been unable to complete it. It's affecting his relationship.      Based on pt presenting concerns, the following diagnostic interview was completed. DSM 5 Diagnostic Interview  ADHD    A. Inattention inclusion:     1. Overlooks details: Over at least the last 6 months, have other people told you that you often overlook or miss details, or that you made careless mistakes in your work? Yes, frequently      Examples:  Typically with work for example I'm trying to multitask and I forget certain details. I have forgotten to process something beginning to end. [errors on tests] yes, I would re-read it after and be very angry because I knew the answer but just kind of raced through it. 2. Task inattention: Do you often have difficulty staying focused on a task or activity, such as reading a lengthy writing or listening to a lecture or conversation? yes   Examples:  I do try to read fairly often and it will take me double the time because I have to read everything twice. I get to the end of the page and realize I have no idea what it said and I have to start over  3. Appears not to listen: Do other people tell you that when they speak to you, your mind often seems to be elsewhere or that it seems like you are not listening? yes   Examples:  YOSELIN is constantly getting frustrated because she's telling me something and I'm looking away, thinking about something else. I've been yelled at by mom, grandma for just saying 'yeah' but i'm not listening  4. Fails to finish tasks: Do you often struggle to finish schoolwork, chores, or work assignments because you lose focus or are easily sidetracked? Yes   Examples:  I will start a million different things at once and get side tracked constantly so a lot of things don't get finished. While cleaning, i'll start making dinner then I start doing dishes, half way through that I'll get a text, sit down, completely forget what I was doing. 5. Difficulty organizing tasks: Do you find it difficult to organize tasks or activities? Do you struggle with time management or fail to meet deadlines? Yes   Examples:  Its often the procrastination because I let myself get distracted. We have a joke that my gf is my  because she keeps me on track. 6. Avoids tasks requiring sustained mental activity: Do you often avoid tasks that require sustained mental effort? Yes, 100%   Examples:  Like homework as a kid. I could not put myself in one spot to sit and do it. As an adult, I have my taxes sitting over there that I haven't touched. 7. Often loses things necessary for tasks: Do you often lose things that are essential for tasks or activities, such as school materials, books, tools, wallets, keys, paperwork, eyeglasses, or your phone? Yes   Examples:  I've had so many issues with this so I have kind of a checklist when I leave. My dad used to say if my head wasn't attached to me, i'd forget it. As a teen, would lose my wallet all the time. If its phone, keys, wallet now I am usually ok because I always check, but I do have days where I forget to bring one. 8. Easily distracted: Do you find that you are often easily distracted by things or thoughts unrelated to the activity or task your are supposed to be doing? Yes   Examples: The other day gf talking and I was totally distracted by a bug on the wall. [work] it does happen everyday but a lot of times it's because we are just busy. I might sit down at my desk to work on something and then I get interrupted by someone. Training courses for past two weeks and I've done like two of them. 9. Often forgetful: Do you find, or do other peopled find, that you are often forgetful in your daily activities? Yes   Examples:  Forgetful, not realizing that I agreed to do something, forgot to take trash out yesterday, that's why we have the joke about gf being my     B.   Hyperactivity/impulsivity inclusion: Alternatively, requires the presence of at least six of the following manifestations of hyperactivity and impulsivity over the same course. 1. Fidgets: Over the last 6 months, have you often found yourself fidgeting with your hands or feet? Do you find it hard to sit without squirming? Yes   Examples:  People are asking me, are you ok, and I'm calm but  I just don't like to sit still. I cannot be held in one spot. 2. Leaves seat: When you are in a situation where you are expected to sit, do you often leave your seat? Yes   Examples:  I would get in trouble for getting up, usually to go mess around with something my friend was doing. Even through high school that was a problem. 3. Runs or climbs: Do you often find yourself running around or climbing in a situation where doing so is inappropriate? Sort of   Examples:  As a kid my mom would get upset because I would get hurt trying to climb trees, but like at a  or something, I would not do it there. 4. Unable to maintain quiet: Do you often find yourself unable to play or engage in leisure activities quietly? Maybe, but as a kid we were loud as a group but As an adult, no  5. Hyperactivity: Do you often feel as if you are, or do other people describe you as always being, on the go or acting as if you were driven by a motor ? Do you find it uncomfortable to sit still for an extended time? Yeah, im constantly trying to do something. I just dont like to sit still   Examples: We will sit down to watch a movie and I get up after a few minutes and just keep getting up. At work, similar things  6. Talks excessively: Do you often talk excessively? Once in a while   Examples: It depends on who I am with. If I don't know them, then no. But if im comfortable then yeah  7. Blurts answers: Do often struggle to wait your turn in a conversation? Do you often complete other peoples sentences or blurt out an answer before a question has been completed? I do   Examples:  I used to do that really bad as a kid and I got in trouble.  Now it really frustrates me because I really try not to.   8. Struggles to take turns: Do you often have difficulty waiting your turn or waiting in line? Yes   Examples:  I hate lines. I hate waiting for everything. I will start sweating, I will get really uncomfortable, have left stores on multiple occasions   9. Interrupts or intrudes: Do you often butt into other peoples activities, conversations, or games? Somewhat, if people comfortable with then yeah, but otherwise no   Do you often start using other peoples things without permission? As a kid yeah, as an adult no   Examples: Taking grandpa's stuff like tools, getting in trouble for that. History:        Diagnosis Date    Anxiety     IBS (irritable bowel syndrome)     SVT [facility]supra[facility]ricular tachycardia) ([facility]) 5/2/2016    Thumb fracture     saw [facility]           Medications:   Current Outpatient Medications   Medication Sig Dispense Refill    atomoxetine (STRATTERA) 40 MG capsule Take 1 capsule by mouth 2 times daily 60 capsule 3    dicyclomine (BENTYL) 10 MG capsule Take 1 capsule by mouth 3 times daily as needed (intestinal cramp) 90 capsule 3    hydrocortisone (ANUSOL-HC) 2.5 % CREA rectal cream Apply to rectal area BID 28 g 0     No current facility-administered medications for this visit.        Social History:   Social History     Socioeconomic History    Marital status: Single     Spouse name: Not on file    Number of children: Not on file    Years of education: Not on file    Highest education level: Not on file   Occupational History    Not on file   Tobacco Use    Smoking status: Never Smoker    Smokeless tobacco: Never Used   Substance and Sexual Activity    Alcohol use: No    Drug use: No    Sexual activity: Not on file   Other Topics Concern    Not on file   Social History Narrative    Not on file     Social Determinants of Health     Financial Resource Strain: Low Risk     Difficulty of Paying Living Expenses: Not hard at all   Food Insecurity: No Food Insecurity  Worried About Running Out of Food in the Last Year: Never true    Mayito of Food in the Last Year: Never true   Transportation Needs: No Transportation Needs    Lack of Transportation (Medical): No    Lack of Transportation (Non-Medical): No   Physical Activity:     Days of Exercise per Week: Not on file    Minutes of Exercise per Session: Not on file   Stress:     Feeling of Stress : Not on file   Social Connections:     Frequency of Communication with Friends and Family: Not on file    Frequency of Social Gatherings with Friends and Family: Not on file    Attends Restorationism Services: Not on file    Active Member of [address] [place] [address] [employer] or Organizations: Not on file    Attends Club or Organization Meetings: Not on file    Marital Status: Not on file   Intimate Partner Violence:     Fear of Current or Ex-Partner: Not on file    Emotionally Abused: Not on file    Physically Abused: Not on file    Sexually Abused: Not on file   Housing Stability:     Unable to Pay for Housing in the Last Year: Not on file    Number of [place] in the Last Year: Not on file    Unstable Housing in the Last Year: Not on file         Family History:   Family History   Problem Relation Age of Onset    Diabetes Maternal Grandmother     Diabetes Maternal Grandfather     Diabetes Paternal Grandmother     Diabetes Paternal Grandfather        TOBACCO:   reports that he has never smoked. He has never used smokeless tobacco.  ETOH:   reports no history of alcohol use.        O:  MSE:    Appearance    alert, cooperative   Personal Hygiene : appropriately dressed, appropriately groomed and healthy looking  Appetite abnormal  Sleep disturbance Yes, including: frequent night time awakening and difficulty falling asleep  Fatigue Yes  Loss of pleasure Yes  Impulsive behavior No  Speech    spontaneous, normal rate and normal volume  Mood   neutral   Affect    normal affect  Thought Content    intact  Thought Process    linear, goal directed and coherent  Associations    logical connections  Insight    fair  Judgment    good  Orientation    oriented to person, place, time, and general circumstances  Memory    recent and remote memory intact  Attention/Concentration    Intact during this encounter, but pt reports impairment  Morbid ideation No  Suicide Assessment    no suicidal ideation        A:    Pt reports no change in symptoms or functioning over past week. PHQ Scores 1/21/2022 6/4/2021 4/25/2017   PHQ2 Score 4 0 0   PHQ9 Score 17 0 0     Interpretation of Total Score Depression Severity: 1-4 = Minimal depression, 5-9 = Mild depression, 10-14 = Moderate depression, 15-19 = Moderately severe depression, 20-27 = Severe depression    PHQ not administered at this visit    Pt would benefit from further assessment to aid in diagnostic clarification. Diagnosis:    Generalized anxiety disorder    R/O MDD  R/O ADHD    Plan:  Pt interventions:  Established rapport, Conducted functional assessment, Supportive techniques and Provided Psychoeducation re: assessment process        Pt Behavioral Change Plan:  Follow up on Monday, 1/31 for feedback    Please note this report has been partially produced using speech recognition software and may cause contain errors related to that system including grammar, punctuation and spelling as well as words and phrases that may seem inappropriate. If there are questions or concerns please feel free to contact me to clarify.

## 2022-01-31 ENCOUNTER — VIRTUAL VISIT (OUTPATIENT)
Dept: FAMILY MEDICINE CLINIC | Age: 27
End: 2022-01-31
Payer: COMMERCIAL

## 2022-01-31 ENCOUNTER — VIRTUAL VISIT (OUTPATIENT)
Dept: BEHAVIORAL/MENTAL HEALTH CLINIC | Age: 27
End: 2022-01-31
Payer: COMMERCIAL

## 2022-01-31 DIAGNOSIS — F90.9 ATTENTION DEFICIT HYPERACTIVITY DISORDER (ADHD), UNSPECIFIED ADHD TYPE: Primary | ICD-10-CM

## 2022-01-31 DIAGNOSIS — F90.2 ATTENTION DEFICIT HYPERACTIVITY DISORDER (ADHD), COMBINED TYPE: ICD-10-CM

## 2022-01-31 DIAGNOSIS — F41.1 GENERALIZED ANXIETY DISORDER: Primary | ICD-10-CM

## 2022-01-31 PROCEDURE — 99214 OFFICE O/P EST MOD 30 MIN: CPT | Performed by: FAMILY MEDICINE

## 2022-01-31 PROCEDURE — 98966 PH1 ASSMT&MGMT NQHP 5-10: CPT | Performed by: PSYCHOLOGIST

## 2022-01-31 PROCEDURE — G8427 DOCREV CUR MEDS BY ELIG CLIN: HCPCS | Performed by: FAMILY MEDICINE

## 2022-01-31 RX ORDER — METHYLPHENIDATE HYDROCHLORIDE 18 MG/1
18 TABLET ORAL DAILY
Qty: 30 TABLET | Refills: 0 | Status: SHIPPED | OUTPATIENT
Start: 2022-01-31 | End: 2022-03-07 | Stop reason: SDUPTHER

## 2022-01-31 ASSESSMENT — VISUAL ACUITY: OU: 1

## 2022-01-31 ASSESSMENT — ENCOUNTER SYMPTOMS
CHEST TIGHTNESS: 0
SHORTNESS OF BREATH: 0
PHOTOPHOBIA: 0
ABDOMINAL DISTENTION: 0
ABDOMINAL PAIN: 0

## 2022-01-31 NOTE — PROGRESS NOTES
Behavioral Health Consultation  Yajaira Culver, [address]. Psychologist  1/31/22  2:05 PM EST      Time spent with Patient: 7 minutes  This is patient's third  [facility] appointment. Reason for Consult:  stress and attention concerns  Referring Provider: No referring provider defined for this encounter. Feedback given to PCP. TELEHEALTH EVALUATION -- Audio and/or Visual (During [facility] public health emergency)    Due to COVID 19 outbreak, patient's office visit was converted to a virtual visit. Patient was contacted and agreed to proceed with a virtual visit via Telephone Visit. Patient reports that they are located at home. Provider located at home office. The risks and benefits of converting to a virtual visit were discussed in light of the current infectious disease epidemic. Patient also understood that insurance coverage and co-pays are up to their individual insurance plans. Patient provides verbal consent for this visit to be billed to insurance. Pursuant to the emergency declaration under the [address] [place] [address], [facility] waiver authority and the [employer] and [employer] Act, this Virtual  Visit was conducted, with patient's consent, to reduce the patient's risk of exposure to COVID-19 and provide continuity of care for an established patient. Services were provided through a telephonic synchronous discussion virtually to substitute for in-person clinic visit. S:  Provided feedback on ADHD assessment results as indicated below. Pt expressed understanding. Plans to schedule with PCP.       History:        Diagnosis Date    Anxiety     IBS (irritable bowel syndrome)     SVT [facility]supra[facility]ricular tachycardia) ([facility]) 5/2/2016    Thumb fracture     saw [facility]           Medications:   Current Outpatient Medications   Medication Sig Dispense Refill    atomoxetine (STRATTERA) 40 MG capsule Take 1 capsule by mouth 2 times daily 60 Mom states he began to have cough and SOB this am. Retractions noted. capsule 3    dicyclomine (BENTYL) 10 MG capsule Take 1 capsule by mouth 3 times daily as needed (intestinal cramp) 90 capsule 3    hydrocortisone (ANUSOL-HC) 2.5 % CREA rectal cream Apply to rectal area BID 28 g 0     No current facility-administered medications for this visit. Social History:   Social History     Socioeconomic History    Marital status: Single     Spouse name: Not on file    Number of children: Not on file    Years of education: Not on file    Highest education level: Not on file   Occupational History    Not on file   Tobacco Use    Smoking status: Never Smoker    Smokeless tobacco: Never Used   Substance and Sexual Activity    Alcohol use: No    Drug use: No    Sexual activity: Not on file   Other Topics Concern    Not on file   Social History Narrative    Not on file     Social Determinants of Health     Financial Resource Strain: Low Risk     Difficulty of Paying Living Expenses: Not hard at all   Food Insecurity: No Food Insecurity    Worried About [facility] in the Last Year: Never true    Mayito of Food in the Last Year: Never true   Transportation Needs: No Transportation Needs    Lack of Transportation (Medical): No    Lack of Transportation (Non-Medical):  No   Physical Activity:     Days of Exercise per Week: Not on file    Minutes of Exercise per Session: Not on file   Stress:     Feeling of Stress : Not on file   Social Connections:     Frequency of Communication with Friends and Family: Not on file    Frequency of Social Gatherings with Friends and Family: Not on file    Attends Hindu Services: Not on file    Active Member of Clubs or Organizations: Not on file    Attends Club or Organization Meetings: Not on file    Marital Status: Not on file   Intimate Partner Violence:     Fear of Current or Ex-Partner: Not on file    Emotionally Abused: Not on file    Physically Abused: Not on file    Sexually Abused: Not on file   Housing Stability:     Unable to Pay for Housing in the Last Year: Not on file    Number of Places Lived in the Last Year: Not on file    Unstable Housing in the Last Year: Not on file         Family History:   Family History   Problem Relation Age of Onset    Diabetes Maternal Grandmother     Diabetes Maternal Grandfather     Diabetes Paternal Grandmother     Diabetes Paternal Grandfather        TOBACCO:   reports that he has never smoked. He has never used smokeless tobacco.  ETOH:   reports no history of alcohol use. O:  MSE:    Appearance    alert, cooperative   Personal Hygiene : appropriately dressed, appropriately groomed and healthy looking  Appetite abnormal  Sleep disturbance Yes, including: frequent night time awakening and difficulty falling asleep  Fatigue Yes  Loss of pleasure Yes  Impulsive behavior No  Speech    spontaneous, normal rate and normal volume  Mood   neutral   Affect    normal affect  Thought Content    intact  Thought Process    linear, goal directed and coherent  Associations    logical connections  Insight    fair  Judgment    good  Orientation    oriented to person, place, time, and general circumstances  Memory    recent and remote memory intact  Attention/Concentration    Intact during this encounter, but pt reports impairment  Morbid ideation No  Suicide Assessment    no suicidal ideation        A:    Pt reports no change in symptoms or functioning over past week. Administered the CAARS, which is a measure of adult ADHD-related symptoms and behaviors. Scores indicate inattention/memory problems, hyperactivity/restlessness, and more mild problems with impulsivity/emotional lability. The patients DSM-5 inattentive symptoms and DSM-5 hyperactive-impulsive subscales  indicated consistent  tendencies associated with combined subtype of ADHD. The pt's DSM-5 total ADHD symptoms  indicate the patient likely does meet criteria for ADHD as described in the DSM-5.   The pt's ADHD index was elevated indicating increased risk for ADHD compared to other adults. Administered the CAARS, Observer Report, which is a measure of adult ADHD-related symptoms and behaviors completed by someone who knows the patient well. The patient's girlfriend (living together) completed this form. Scores indicate inattention/memory problems, hyperactivity/restlessness,and more mild concerns with impulsivity/emotional lability. The patients DSM-5 inattentive symptoms and DSM-5 hyperactive-impulsive subscales  indicated consistent  tendencies associated with combined subtype of ADHD. The pt's DSM-5 total ADHD symptoms  indicate the patient likely does meet criteria for ADHD as described in the DSM-5. The pt's ADHD index was elevated indicating increased risk of ADHD compared to other adults. Administered the [facility] which is used to retroactively assess ADHD symptoms. A cutoff score of 46 on 25 questions associated with ADHD is recommended to determine if symptoms are consistent with ADHD. The pt's score was a 61 indicating clinically significant symptoms of ADHD from childhood. Given results from questionnaires and diagnostic interview, a diagnosis of ADHD, Combined Presentation is added. Diagnosis:    Generalized anxiety disorder  ADHD, Combined Presentation. Plan:  Pt interventions:  Palmer-setting to identify pt's primary goals for [address] visit / overall health, Supportive techniques and Provided Psychoeducation re: ADHD, discussed counseling        Pt Behavioral Change Plan:  Follow up with PCP to discuss treatment options  Follow up with counseling as needed    Please note this report has been partially produced using speech recognition software and may cause contain errors related to that system including grammar, punctuation and spelling as well as words and phrases that may seem inappropriate. If there are questions or concerns please feel free to contact me to clarify.

## 2022-01-31 NOTE — PATIENT INSTRUCTIONS
Patient informed Concerta is a controlled substance. Patient is aware he will need routine appointments for prescriptions of this. Patient is aware of next appointment should be in the office to double check the Concerta is not elevating his blood pressure. He will see his PCP for this. Recommend he tries at least 5-8 doses before he has his appointment with the PCP to review this medication. He has been given a 1 month supply.

## 2022-01-31 NOTE — PROGRESS NOTES
Diagnosis Orders   1. Attention deficit hyperactivity disorder (ADHD), unspecified ADHD type  methylphenidate (CONCERTA) 18 MG extended release tablet         No orders of the defined types were placed in this encounter. Return in about 2 weeks (around 2/14/2022) for with PCP. Patient Instructions   Patient informed Concerta is a controlled substance. Patient is aware he will need routine appointments for prescriptions of this. Patient is aware of next appointment should be in the office to double check the Concerta is not elevating his blood pressure. He will see his PCP for this. Recommend he tries at least 5-8 doses before he has his appointment with the PCP to review this medication. He has been given a 1 month supply. This visit began at 4:33pm    The location for this appointment was the [facility] primary care site. TELEHEALTH APPOINTMENT  Patient has been screened to determine that this visit qualifies for a \"Video Visit\". This visit was via video due to the restrictions of the COVID-19 pandemic. All issues as below were discussed and addressed but note a limited visually based physical exam was performed. If it was felt the patient should be evaluated in the clinic there will be comment below demonstrating they were directed there. The patient is aware and has given verbal consent to be billed for this video encounter. The resources used for this visit were [employer] for chart access and [employer]. me    Chief Complaint   Patient presents with    Follow-up    Discuss Medications       Patient did not start Strattera because he does not want a medication that takes a while to kick in. He also wants something that works only when he needs it. He states he has time periods where he needs to have increased focus for a week or 2 at a time other times he does not need as much focus.   In addition there are times where he feels he can do it on his own and does not need medication. He states they briefly talked about Vyvanse which is interesting to him. He likes the as needed ability of that medication more however it is expensive. He thinks he found a comparable covered by his insurance in [place]. Records were reviewed for counseling notes verifying it diagnosis of attention deficit as well as testing to prove that          PMH:    Current Outpatient Medications on File Prior to Visit   Medication Sig Dispense Refill    atomoxetine (STRATTERA) 40 MG capsule Take 1 capsule by mouth 2 times daily 60 capsule 3    dicyclomine (BENTYL) 10 MG capsule Take 1 capsule by mouth 3 times daily as needed (intestinal cramp) 90 capsule 3    hydrocortisone (ANUSOL-HC) 2.5 % CREA rectal cream Apply to rectal area BID 28 g 0     No current facility-administered medications on file prior to visit. Past Medical History:   Diagnosis Date    Anxiety     IBS (irritable bowel syndrome)     [facility]    Thumb fracture     saw [facility]     History reviewed. No pertinent surgical history.   Social History     Socioeconomic History    Marital status: Single     Spouse name: Not on file    Number of children: Not on file    Years of education: Not on file    Highest education level: Not on file   Occupational History    Not on file   Tobacco Use    Smoking status: Never Smoker    Smokeless tobacco: Never Used   Substance and Sexual Activity    Alcohol use: No    Drug use: No    Sexual activity: Not on file   Other Topics Concern    Not on file   Social History Narrative    Not on file     Social Determinants of Health     Financial Resource Strain: Low Risk     Difficulty of Paying Living Expenses: Not hard at all   Food Insecurity: No Food Insecurity    Worried About Running Out of Food in the Last Year: Never true    [place] of Food in the Last Year: Never true   Transportation Needs: No Transportation Needs    Lack of Transportation (Medical): No    Lack of Transportation (Non-Medical): No   Physical Activity:     Days of Exercise per Week: Not on file    Minutes of Exercise per Session: Not on file   Stress:     Feeling of Stress : Not on file   Social Connections:     Frequency of Communication with Friends and Family: Not on file    Frequency of Social Gatherings with Friends and Family: Not on file    Attends Judaism Services: Not on file    Active Member of [address] [place] [address] [employer] or Organizations: Not on file    Attends Club or Organization Meetings: Not on file    Marital Status: Not on file   Intimate Partner Violence:     Fear of Current or Ex-Partner: Not on file    Emotionally Abused: Not on file    Physically Abused: Not on file    Sexually Abused: Not on file   Housing Stability:     Unable to Pay for Housing in the Last Year: Not on file    Number of [place] in the Last Year: Not on file    Unstable Housing in the Last Year: Not on file     Family History   Problem Relation Age of Onset    Diabetes Maternal Grandmother     Diabetes Maternal Grandfather     Diabetes Paternal Grandmother     Diabetes Paternal Grandfather      Allergies:  Bee venom, Beesix [pyridoxine hcl], and Other    Review of Systems   Constitutional: Negative for activity change, appetite change, diaphoresis and unexpected weight change. Eyes: Negative for photophobia and visual disturbance. Respiratory: Negative for chest tightness and shortness of breath. No orthopnea   Cardiovascular: Negative for chest pain, palpitations and leg swelling. Gastrointestinal: Negative for abdominal distention and abdominal pain. Genitourinary: Negative for flank pain and frequency. Musculoskeletal: Negative for gait problem and joint swelling. Neurological: Negative for dizziness, weakness, light-headedness and headaches. Psychiatric/Behavioral: Negative for confusion.        PHYSICAL EXAM/ RESULTS    (Limited exam: only performed what is ambulation. Gait normal   Skin:     Coloration: Skin is not cyanotic, jaundiced or pale. Findings: No acne. Comments: No evidence of abnormal hair loss. Facial skin without defect. Neurological:      Mental Status: He is alert and oriented to person, place, and time. Cranial Nerves: No cranial nerve deficit, dysarthria or facial asymmetry. Coordination: Coordination normal.      Gait: Gait is intact. Comments: Motor function intact for gait and range of motion of large muscle groups of extremities   Psychiatric:         Attention and Perception: Attention and perception normal.         Mood and Affect: Mood and affect normal.         Speech: Speech normal.         Behavior: Behavior normal. Behavior is cooperative. Thought Content: Thought content normal.         Judgment: Judgment normal.         No results found for this or any previous visit (from the past 2016 hour(s)). [] Pt was seen by provider for   Minutes  Counseling and coordination of care was done for all assessment diagnosis listed for today with patient and any family/friend present. More than 50% of this visit was spent coordinating cuurent care, obtaining information for prior records, and counseling for current plan of action. Assessment:       Diagnosis Orders   1. Attention deficit hyperactivity disorder (ADHD), unspecified ADHD type  methylphenidate (CONCERTA) 18 MG extended release tablet         No orders of the defined types were placed in this encounter. Plan:   Return in about 2 weeks (around 2/14/2022) for with PCP. Patient Instructions   Patient informed Concerta is a controlled substance. Patient is aware he will need routine appointments for prescriptions of this. Patient is aware of next appointment should be in the office to double check the Concerta is not elevating his blood pressure. He will see his PCP for this.   Recommend he tries at least 5-8 doses before he has his appointment with the PCP to review this medication. He has been given a 1 month supply. This visit ended at 4:47pm    The resources used for this visit were [employer] for chart access and [employer]. me Jullie Dupre L. Rance Cabot, M.D. An  electronic signature was used to authenticate this note. --Sheba Horton MD on 1/31/2022 at 4:46 PM        Pursuant to the emergency declaration under the [address] [place] [address], [facility] waiver authority and the [employer] and [employer] Act, this Virtual  Visit was conducted, with patient's consent, to reduce the patient's risk of exposure to COVID-19 and provide continuity of care for an established patient. Services were provided through a video synchronous discussion virtually to substitute for in-person clinic visit.

## 2022-03-07 DIAGNOSIS — F90.9 ATTENTION DEFICIT HYPERACTIVITY DISORDER (ADHD), UNSPECIFIED ADHD TYPE: ICD-10-CM

## 2022-03-07 RX ORDER — METHYLPHENIDATE HYDROCHLORIDE 18 MG/1
18 TABLET ORAL DAILY
Qty: 30 TABLET | Refills: 0 | Status: SHIPPED | OUTPATIENT
Start: 2022-03-07 | End: 2022-04-11 | Stop reason: SDUPTHER

## 2022-04-11 DIAGNOSIS — F90.9 ATTENTION DEFICIT HYPERACTIVITY DISORDER (ADHD), UNSPECIFIED ADHD TYPE: ICD-10-CM

## 2022-04-11 RX ORDER — METHYLPHENIDATE HYDROCHLORIDE 18 MG/1
18 TABLET ORAL DAILY
Qty: 30 TABLET | Refills: 0 | Status: SHIPPED | OUTPATIENT
Start: 2022-04-11 | End: 2022-05-11

## 2022-04-18 ENCOUNTER — OFFICE VISIT (OUTPATIENT)
Dept: FAMILY MEDICINE CLINIC | Age: 27
End: 2022-04-18
Payer: COMMERCIAL

## 2022-04-18 VITALS
HEART RATE: 101 BPM | TEMPERATURE: 97.7 F | SYSTOLIC BLOOD PRESSURE: 110 MMHG | HEIGHT: 70 IN | OXYGEN SATURATION: 98 % | WEIGHT: 167.2 LBS | BODY MASS INDEX: 23.94 KG/M2 | DIASTOLIC BLOOD PRESSURE: 72 MMHG

## 2022-04-18 DIAGNOSIS — R41.840 CONCENTRATION DEFICIT: ICD-10-CM

## 2022-04-18 DIAGNOSIS — F90.9 ATTENTION DEFICIT HYPERACTIVITY DISORDER (ADHD), UNSPECIFIED ADHD TYPE: Primary | ICD-10-CM

## 2022-04-18 PROCEDURE — 99213 OFFICE O/P EST LOW 20 MIN: CPT | Performed by: FAMILY MEDICINE

## 2022-04-18 NOTE — PROGRESS NOTES
Chief Complaint   Patient presents with    ADHD     check up       HPI:  Minal Mitchell is a 24 y.o. male     Follow up ADHD  Had eval with Dr. Farrah Stuartab  Was started on concerta by Dr. Arthur Márquez  Has been working well     Avoids the med on weekends/non-work days    May want to try to change to something else  Has new insurance   Actually wants to try vyvanse with next refill     Able to focus  Less anxiety     Patient Active Problem List   Diagnosis    Thumb fracture    Fitting and adjustment of orthopedic device    Paroxysmal SVT (supraventricular tachycardia) ([facility])    Exertional headache    Severe right groin pain       Current Outpatient Medications   Medication Sig Dispense Refill    methylphenidate (CONCERTA) 18 MG extended release tablet Take 1 tablet by mouth daily for 30 days. 30 tablet 0    dicyclomine (BENTYL) 10 MG capsule Take 1 capsule by mouth 3 times daily as needed (intestinal cramp) 90 capsule 3    hydrocortisone (ANUSOL-HC) 2.5 % CREA rectal cream Apply to rectal area BID 28 g 0    atomoxetine (STRATTERA) 40 MG capsule Take 1 capsule by mouth 2 times daily 60 capsule 3     No current facility-administered medications for this visit. Past Medical History:   Diagnosis Date    Anxiety     IBS (irritable bowel syndrome)     [facility]2016    Thumb fracture     saw [facility]     History reviewed. No pertinent surgical history.   Family History   Problem Relation Age of Onset    Diabetes Maternal Grandmother     Diabetes Maternal Grandfather     Diabetes Paternal Grandmother     Diabetes Paternal Grandfather      Social History     Socioeconomic History    Marital status: Single     Spouse name: None    Number of children: None    Years of education: None    Highest education level: None   Occupational History    None   Tobacco Use    Smoking status: Never Smoker    Smokeless tobacco: Never Used   Substance and Sexual Activity    Alcohol use: No    Drug use: No    Sexual activity: None   Other Topics Concern    None   Social History Narrative    None     Social Determinants of Health     Financial Resource Strain: Low Risk     Difficulty of Paying Living Expenses: Not hard at all   Food Insecurity: No Food Insecurity    Worried About Running Out of Food in the Last Year: Never true    May[place] of Food in the Last Year: Never true   Transportation Needs: No Transportation Needs    Lack of Transportation (Medical): No    Lack of Transportation (Non-Medical):  No   Physical Activity:     Days of Exercise per Week: Not on file    Minutes of Exercise per Session: Not on file   Stress:     Feeling of Stress : Not on file   Social Connections:     Frequency of Communication with Friends and Family: Not on file    Frequency of Social Gatherings with Friends and Family: Not on file    Attends Shinto Services: Not on file    Active Member of [address] [place] [address] [employer] or Organizations: Not on file    Attends Club or Organization Meetings: Not on file    Marital Status: Not on file   Intimate Partner Violence:     Fear of Current or Ex-Partner: Not on file    Emotionally Abused: Not on file    Physically Abused: Not on file    Sexually Abused: Not on file   Housing Stability:     Unable to Pay for Housing in the Last Year: Not on file    Number of [facility] in the Last Year: Not on file    Unstable Housing in the Last Year: Not on file     Allergies   Allergen Reactions    Bee Venom Other (See Comments)     Localized reaction    Beesix [Pyridoxine Hcl]     Other      Beta blocker intolerance does take \"prn\"       Review of Systems:   General ROS: negative for - chills, fatigue, fever, malaise, weight gain or weight loss  Respiratory ROS: no cough, shortness of breath, or wheezing  Cardiovascular ROS: hx cardiac ablation  Gastrointestinal ROS: gas, loose bowels   Genito-Urinary ROS: no dysuria, trouble voiding  Musculoskeletal ROS: negative for - gait disturbance, joint pain or joint stiffness  Neurological ROS: negative for - behavioral changes, memory loss, numbness/tingling, tremors or weakness    In general patient otherwise reports feeling well. Physical Exam:  /72   Pulse 101   Temp 97.7 °F (36.5 °C)   Ht 5' 10\" (1.778 m)   Wt 167 lb 3.2 oz (75.8 kg)   SpO2 98%   BMI 23.99 kg/m²     Gen: Well, NAD, Alert, Oriented x 3   HEENT: EOMI, eyes clear, MMM  Skin: small lesion right foot   Neck: no significant lymphadenopathy or thyromegaly  Lungs: CTA B w/out Rales/Wheezes/Rhonchi, Good respiratory effort   Heart: RRR, S1S2, w/out M/R/G, non-displaced PMI    Ext: No C/C/E Bilaterally. Neuro: Neurovascularly intact w/ Sensory/Motor intact UE/LE Bilaterally. Psych:euthymic    Lab Results   Component Value Date    WBC 7.2 06/04/2021    HGB 16.0 06/04/2021    HCT 47.6 06/04/2021     06/04/2021    CHOL 132 09/19/2014    TRIG 64 09/19/2014    HDL 48 09/19/2014    ALT <5 06/04/2021    AST 25 06/04/2021     06/04/2021    K 4.4 06/04/2021     06/04/2021    CREATININE 0.97 06/04/2021    BUN 13 06/04/2021    CO2 28 06/04/2021    TSH 2.080 09/19/2014    INR 1.1 09/19/2014    LABA1C 5.6 12/10/2013         A&P   Diagnosis Orders   1. Attention deficit hyperactivity disorder (ADHD), unspecified ADHD type     2.  Concentration deficit         Just filled concerta    Will try switching to vyvanse with next refill      Bill Metcalf MD

## 2022-05-11 ENCOUNTER — TELEPHONE (OUTPATIENT)
Dept: FAMILY MEDICINE CLINIC | Age: 27
End: 2022-05-11

## 2022-05-11 DIAGNOSIS — F90.9 ATTENTION DEFICIT HYPERACTIVITY DISORDER (ADHD), UNSPECIFIED ADHD TYPE: Primary | ICD-10-CM

## 2022-05-11 NOTE — TELEPHONE ENCOUNTER
Orders Placed This Encounter   Medications    lisdexamfetamine (VYVANSE) 30 MG capsule     Sig: Take 1 capsule by mouth every morning for 30 days. Dispense:  30 capsule     Refill:  0       The above med(s) were e-scripted to the patient's pharmacy.    Please advise patient  Alireza Ryder MD

## 2022-06-14 DIAGNOSIS — F90.9 ATTENTION DEFICIT HYPERACTIVITY DISORDER (ADHD), UNSPECIFIED ADHD TYPE: ICD-10-CM

## 2022-06-14 NOTE — TELEPHONE ENCOUNTER
Future Appointments    This patient does not currently have any appointments scheduled.     Past Visits    Date Provider Specialty Visit Type Primary Dx   04/18/2022 Dyan Lesches, MD Family Medicine Office Visit Attention deficit hyperactivity disorder (ADHD), unspecified ADHD type

## 2022-07-18 DIAGNOSIS — F90.9 ATTENTION DEFICIT HYPERACTIVITY DISORDER (ADHD), UNSPECIFIED ADHD TYPE: ICD-10-CM

## 2022-07-25 ENCOUNTER — TELEMEDICINE (OUTPATIENT)
Dept: FAMILY MEDICINE CLINIC | Age: 27
End: 2022-07-25
Payer: COMMERCIAL

## 2022-07-25 DIAGNOSIS — F90.9 ATTENTION DEFICIT HYPERACTIVITY DISORDER (ADHD), UNSPECIFIED ADHD TYPE: Primary | ICD-10-CM

## 2022-07-25 PROCEDURE — 99441 PR PHYS/QHP TELEPHONE EVALUATION 5-10 MIN: CPT | Performed by: FAMILY MEDICINE

## 2022-07-25 SDOH — ECONOMIC STABILITY: FOOD INSECURITY: WITHIN THE PAST 12 MONTHS, YOU WORRIED THAT YOUR FOOD WOULD RUN OUT BEFORE YOU GOT MONEY TO BUY MORE.: NEVER TRUE

## 2022-07-25 SDOH — ECONOMIC STABILITY: FOOD INSECURITY: WITHIN THE PAST 12 MONTHS, THE FOOD YOU BOUGHT JUST DIDN'T LAST AND YOU DIDN'T HAVE MONEY TO GET MORE.: NEVER TRUE

## 2022-07-25 ASSESSMENT — SOCIAL DETERMINANTS OF HEALTH (SDOH): HOW HARD IS IT FOR YOU TO PAY FOR THE VERY BASICS LIKE FOOD, HOUSING, MEDICAL CARE, AND HEATING?: NOT HARD AT ALL

## 2022-07-25 NOTE — PROGRESS NOTES
Flow -         No exam  Phone visit  Patient in no significant distress, conversant    Due to this being a TeleHealth encounter, evaluation of the following organ systems is limited: Vitals/Constitutional/EENT/Resp/CV/GI//MS/Neuro/Skin/Heme-Lymph-Imm. Lab Results   Component Value Date    WBC 7.2 06/04/2021    HGB 16.0 06/04/2021    HCT 47.6 06/04/2021     06/04/2021    CHOL 132 09/19/2014    TRIG 64 09/19/2014    HDL 48 09/19/2014    ALT <5 06/04/2021    AST 25 06/04/2021     06/04/2021    K 4.4 06/04/2021     06/04/2021    CREATININE 0.97 06/04/2021    BUN 13 06/04/2021    CO2 28 06/04/2021    TSH 2.080 09/19/2014    INR 1.1 09/19/2014    LABA1C 5.6 12/10/2013         ASSESSMENT/PLAN:     Diagnosis Orders   1. Attention deficit hyperactivity disorder (ADHD), unspecified ADHD type          Continue same med regimen  F/u 3 mos    No follow-ups on file. An  electronic signature was used to authenticate this note. --Max Avelar MD on 7/25/2022 at 2:49 PM        Pursuant to the emergency declaration under the [facility] waiver authority and the [employer] and [employer] Act, this Virtual  Visit was conducted, with patient's consent, to reduce the patient's risk of exposure to COVID-19 and provide continuity of care for an established patient.     5 minute call

## 2022-08-23 DIAGNOSIS — F90.9 ATTENTION DEFICIT HYPERACTIVITY DISORDER (ADHD), UNSPECIFIED ADHD TYPE: ICD-10-CM

## 2022-09-26 DIAGNOSIS — F90.9 ATTENTION DEFICIT HYPERACTIVITY DISORDER (ADHD), UNSPECIFIED ADHD TYPE: ICD-10-CM

## 2022-09-26 NOTE — TELEPHONE ENCOUNTER
Future Appointments    Encounter Information    Provider Department Appt Notes   10/7/2022 Radha Jay MD [facility] Primary Care medications     Past Visits    Date Provider Specialty Visit Type Primary Dx   07/25/2022 Radha Jay MD Family Medicine Telemedicine Attention deficit hyperactivity disorder (ADHD), unspecified ADHD type

## 2022-10-07 ENCOUNTER — OFFICE VISIT (OUTPATIENT)
Dept: FAMILY MEDICINE CLINIC | Age: 27
End: 2022-10-07
Payer: COMMERCIAL

## 2022-10-07 VITALS
DIASTOLIC BLOOD PRESSURE: 70 MMHG | TEMPERATURE: 96.9 F | WEIGHT: 153.2 LBS | OXYGEN SATURATION: 98 % | HEIGHT: 70 IN | SYSTOLIC BLOOD PRESSURE: 108 MMHG | BODY MASS INDEX: 21.93 KG/M2 | HEART RATE: 86 BPM

## 2022-10-07 DIAGNOSIS — F90.9 ATTENTION DEFICIT HYPERACTIVITY DISORDER (ADHD), UNSPECIFIED ADHD TYPE: ICD-10-CM

## 2022-10-07 PROCEDURE — 99213 OFFICE O/P EST LOW 20 MIN: CPT | Performed by: FAMILY MEDICINE

## 2022-10-07 NOTE — PROGRESS NOTES
Chief Complaint   Patient presents with    ADHD     Check up       HPI:  Ritesh Ramírez is a 24 y.o. male     Follow up ADHD  Comopleted eval previously with Dr. Rosario Mora     Avoids the med on weekends/non-work days    Able to focus  Less anxiety     Vyvanse has been good for him    Declines flu shot     Patient Active Problem List   Diagnosis    Thumb fracture    Fitting and adjustment of orthopedic device    Paroxysmal SVT (supraventricular tachycardia) ([facility])    Exertional headache    Severe right groin pain       Current Outpatient Medications   Medication Sig Dispense Refill    lisdexamfetamine (VYVANSE) 30 MG capsule Take 1 capsule by mouth every morning for 30 days. 30 capsule 0     No current facility-administered medications for this visit. Past Medical History:   Diagnosis Date    Anxiety     IBS (irritable bowel syndrome)     [facility]    Thumb fracture     saw [facility]     History reviewed. No pertinent surgical history. Family History   Problem Relation Age of Onset    Diabetes Maternal Grandmother     Diabetes Maternal Grandfather     Diabetes Paternal Grandmother     Diabetes Paternal Grandfather      Social History     Socioeconomic History    Marital status: Single     Spouse name: None    Number of children: None    Years of education: None    Highest education level: None   Tobacco Use    Smoking status: Never    Smokeless tobacco: Never   Substance and Sexual Activity    Alcohol use: No    Drug use: No     Social Determinants of Health     Financial Resource Strain: Low Risk     Difficulty of Paying Living Expenses: Not hard at all   Food Insecurity: No Food Insecurity    Worried About Running Out of Food in the Last Year: Never true    Ran Out of Food in the Last Year: Never true   Transportation Needs: No Transportation Needs    Lack of Transportation (Medical): No    Lack of Transportation (Non-Medical):  No     Allergies   Allergen Reactions    Bee Venom Other (See Comments)     Localized reaction    Beesix [Pyridoxine Hcl]     Other      Beta blocker intolerance does take \"prn\"       Review of Systems:   General ROS: negative for - chills, fatigue, fever, malaise, weight gain or weight loss  Respiratory ROS: no cough, shortness of breath, or wheezing  Cardiovascular ROS: hx cardiac ablation  Gastrointestinal ROS: gas, loose bowels   Genito-Urinary ROS: no dysuria, trouble voiding  Musculoskeletal ROS: negative for - gait disturbance, joint pain or joint stiffness  Neurological ROS: negative for - behavioral changes, memory loss, numbness/tingling, tremors or weakness    In general patient otherwise reports feeling well. Physical Exam:  /70 (Site: Right Upper Arm)   Pulse 86   Temp 96.9 °F (36.1 °C)   Ht 5' 10\" (1.778 m)   Wt 153 lb 3.2 oz (69.5 kg)   SpO2 98%   BMI 21.98 kg/m²     Gen: Well, NAD, Alert, Oriented x 3   HEENT: EOMI, eyes clear, MMM  Skin: small lesion right foot   Neck: no significant lymphadenopathy or thyromegaly  Lungs: CTA B w/out Rales/Wheezes/Rhonchi, Good respiratory effort   Heart: RRR, S1S2, w/out M/R/G, non-displaced PMI    Ext: No C/C/E Bilaterally. Neuro: Neurovascularly intact w/ Sensory/Motor intact UE/LE Bilaterally. Psych:euthymic    Lab Results   Component Value Date    WBC 7.2 06/04/2021    HGB 16.0 06/04/2021    HCT 47.6 06/04/2021     06/04/2021    CHOL 132 09/19/2014    TRIG 64 09/19/2014    HDL 48 09/19/2014    ALT <5 06/04/2021    AST 25 06/04/2021     06/04/2021    K 4.4 06/04/2021     06/04/2021    CREATININE 0.97 06/04/2021    BUN 13 06/04/2021    CO2 28 06/04/2021    TSH 2.080 09/19/2014    INR 1.1 09/19/2014    LABA1C 5.6 12/10/2013         A&P   Diagnosis Orders   1.  Attention deficit hyperactivity disorder (ADHD), unspecified ADHD type          Refill given recently   Amari Watkins MD

## 2022-10-31 DIAGNOSIS — F90.9 ATTENTION DEFICIT HYPERACTIVITY DISORDER (ADHD), UNSPECIFIED ADHD TYPE: ICD-10-CM

## 2022-11-01 NOTE — TELEPHONE ENCOUNTER
Past Visits    Date Provider Specialty Visit Type Primary Dx   10/07/2022 Kevin Jeffery MD Family Medicine Office Visit Attention deficit hyperactivity disorder (ADHD), unspecified ADHD type   07/25/2022 Kevin Jeffery MD Family Medicine Telemedicine Attention deficit hyperactivity disorder (ADHD), unspecified ADHD type

## 2022-12-05 DIAGNOSIS — F90.9 ATTENTION DEFICIT HYPERACTIVITY DISORDER (ADHD), UNSPECIFIED ADHD TYPE: ICD-10-CM

## 2023-01-16 ENCOUNTER — OFFICE VISIT (OUTPATIENT)
Dept: FAMILY MEDICINE CLINIC | Age: 28
End: 2023-01-16
Payer: COMMERCIAL

## 2023-01-16 VITALS
WEIGHT: 155.2 LBS | HEIGHT: 70 IN | SYSTOLIC BLOOD PRESSURE: 120 MMHG | HEART RATE: 88 BPM | BODY MASS INDEX: 22.22 KG/M2 | OXYGEN SATURATION: 98 % | DIASTOLIC BLOOD PRESSURE: 72 MMHG | TEMPERATURE: 97.3 F

## 2023-01-16 DIAGNOSIS — F90.9 ATTENTION DEFICIT HYPERACTIVITY DISORDER (ADHD), UNSPECIFIED ADHD TYPE: ICD-10-CM

## 2023-01-16 PROCEDURE — 99213 OFFICE O/P EST LOW 20 MIN: CPT | Performed by: FAMILY MEDICINE

## 2023-01-16 ASSESSMENT — PATIENT HEALTH QUESTIONNAIRE - PHQ9
SUM OF ALL RESPONSES TO PHQ QUESTIONS 1-9: 0
1. LITTLE INTEREST OR PLEASURE IN DOING THINGS: 0
SUM OF ALL RESPONSES TO PHQ QUESTIONS 1-9: 0
SUM OF ALL RESPONSES TO PHQ9 QUESTIONS 1 & 2: 0
SUM OF ALL RESPONSES TO PHQ QUESTIONS 1-9: 0
SUM OF ALL RESPONSES TO PHQ QUESTIONS 1-9: 0
2. FEELING DOWN, DEPRESSED OR HOPELESS: 0

## 2023-01-16 NOTE — PROGRESS NOTES
Chief Complaint   Patient presents with    ADHD     Check up       HPI:  Kayla Cueto is a 34 y.o. male     Follow up ADHD  Comopleted eval previously with Dr. Jose Luis Armenta     Avoids the med on weekends/non-work days    Able to focus  Less anxiety     Vyvanse has been good for him    Work has been slower  Taking more breaks from med because of this    Patient Active Problem List   Diagnosis    Thumb fracture    Fitting and adjustment of orthopedic device    Paroxysmal SVT (supraventricular tachycardia) ([facility])    Exertional headache    Severe right groin pain       Current Outpatient Medications   Medication Sig Dispense Refill    lisdexamfetamine (VYVANSE) 30 MG capsule Take 1 capsule by mouth every morning for 30 days. 30 capsule 0     No current facility-administered medications for this visit. Past Medical History:   Diagnosis Date    Anxiety     IBS (irritable bowel syndrome)     SVT (supraventricular tachycardia) ([facility] [address] 5/2/2016    Thumb fracture     saw [facility]     History reviewed. No pertinent surgical history.   Family History   Problem Relation Age of Onset    Diabetes Maternal Grandmother     Diabetes Maternal Grandfather     Diabetes Paternal Grandmother     Diabetes Paternal Grandfather      Social History     Socioeconomic History    Marital status: Single     Spouse name: None    Number of children: None    Years of education: None    Highest education level: None   Tobacco Use    Smoking status: Never    Smokeless tobacco: Never   Substance and Sexual Activity    Alcohol use: No    Drug use: No     Social Determinants of Health     Financial Resource Strain: Low Risk     Difficulty of Paying Living Expenses: Not hard at all   Food Insecurity: No Food Insecurity    Worried About Running Out of Food in the Last Year: Never true    Ran Out of Food in the Last Year: Never true   Transportation Needs: No Transportation Needs    Lack of Transportation (Medical): No    Lack of Transportation (Non-Medical): No     Allergies   Allergen Reactions    Bee Venom Other (See Comments)     Localized reaction    Beesix [Pyridoxine Hcl]     Other      Beta blocker intolerance does take \"prn\"       Review of Systems:   General ROS: negative for - chills, fatigue, fever, malaise, weight gain or weight loss  Respiratory ROS: no cough, shortness of breath, or wheezing  Cardiovascular ROS: hx cardiac ablation  Gastrointestinal ROS: gas, loose bowels   Genito-Urinary ROS: no dysuria, trouble voiding  Musculoskeletal ROS: negative for - gait disturbance, joint pain or joint stiffness  Neurological ROS: negative for - behavioral changes, memory loss, numbness/tingling, tremors or weakness    In general patient otherwise reports feeling well. Physical Exam:  /72 (Site: Left Upper Arm)   Pulse 88   Temp 97.3 °F (36.3 °C)   Ht 5' 10\" (1.778 m)   Wt 155 lb 3.2 oz (70.4 kg)   SpO2 98%   BMI 22.27 kg/m²     Gen: Well, NAD, Alert, Oriented x 3   HEENT: EOMI, eyes clear, MMM  Skin: small lesion right foot   Neck: no significant lymphadenopathy or thyromegaly  Lungs: CTA B w/out Rales/Wheezes/Rhonchi, Good respiratory effort   Heart: RRR, S1S2, w/out M/R/G, non-displaced PMI    Ext: No C/C/E Bilaterally. Neuro: Neurovascularly intact w/ Sensory/Motor intact UE/LE Bilaterally. Psych:euthymic    Lab Results   Component Value Date    WBC 7.2 06/04/2021    HGB 16.0 06/04/2021    HCT 47.6 06/04/2021     06/04/2021    CHOL 132 09/19/2014    TRIG 64 09/19/2014    HDL 48 09/19/2014    ALT <5 06/04/2021    AST 25 06/04/2021     06/04/2021    K 4.4 06/04/2021     06/04/2021    CREATININE 0.97 06/04/2021    BUN 13 06/04/2021    CO2 28 06/04/2021    TSH 2.080 09/19/2014    INR 1.1 09/19/2014    LABA1C 5.6 12/10/2013         A&P   Diagnosis Orders   1.  Attention deficit hyperactivity disorder (ADHD), unspecified ADHD type  lisdexamfetamine (VYVANSE) 30 MG capsule        Refill today    Continue vyvanse at same Alexandrea Johnson MD

## 2023-02-27 DIAGNOSIS — F90.9 ATTENTION DEFICIT HYPERACTIVITY DISORDER (ADHD), UNSPECIFIED ADHD TYPE: ICD-10-CM

## 2023-04-03 DIAGNOSIS — F90.9 ATTENTION DEFICIT HYPERACTIVITY DISORDER (ADHD), UNSPECIFIED ADHD TYPE: ICD-10-CM

## 2023-05-08 DIAGNOSIS — F90.9 ATTENTION DEFICIT HYPERACTIVITY DISORDER (ADHD), UNSPECIFIED ADHD TYPE: ICD-10-CM

## 2023-05-08 NOTE — TELEPHONE ENCOUNTER
Comments: pt had last one today    Last Office Visit (last PCP visit):   4/10/2023    Next Visit Date:  No future appointments. **If hasn't been seen in over a year OR hasn't followed up according to last diabetes/ADHD visit, make appointment for patient before sending refill to provider. Rx requested:  Requested Prescriptions     Pending Prescriptions Disp Refills    lisdexamfetamine (VYVANSE) 30 MG capsule 30 capsule 0     Sig: Take 1 capsule by mouth every morning for 30 days.

## 2023-06-20 DIAGNOSIS — F90.9 ATTENTION DEFICIT HYPERACTIVITY DISORDER (ADHD), UNSPECIFIED ADHD TYPE: ICD-10-CM

## 2023-06-20 NOTE — TELEPHONE ENCOUNTER
Comments:     Last Office Visit (last PCP visit):   4/10/2023    Next Visit Date:  Future Appointments   Date Time Provider Jacinta Thomas   7/10/2023  9:00 AM Faustino Khan MD [facility]       **If hasn't been seen in over a year OR hasn't followed up according to last diabetes/ADHD visit, make appointment for patient before sending refill to provider. Rx requested:  Requested Prescriptions     Pending Prescriptions Disp Refills    lisdexamfetamine (VYVANSE) 30 MG capsule 30 capsule 0     Sig: Take 1 capsule by mouth every morning for 30 days.

## 2023-07-10 ENCOUNTER — OFFICE VISIT (OUTPATIENT)
Dept: FAMILY MEDICINE CLINIC | Age: 28
End: 2023-07-10
Payer: COMMERCIAL

## 2023-07-10 VITALS
DIASTOLIC BLOOD PRESSURE: 68 MMHG | WEIGHT: 154.2 LBS | TEMPERATURE: 98 F | OXYGEN SATURATION: 98 % | HEART RATE: 98 BPM | SYSTOLIC BLOOD PRESSURE: 102 MMHG | BODY MASS INDEX: 22.07 KG/M2 | HEIGHT: 70 IN

## 2023-07-10 DIAGNOSIS — F90.9 ATTENTION DEFICIT HYPERACTIVITY DISORDER (ADHD), UNSPECIFIED ADHD TYPE: Primary | ICD-10-CM

## 2023-07-10 PROCEDURE — 99213 OFFICE O/P EST LOW 20 MIN: CPT | Performed by: FAMILY MEDICINE

## 2023-07-10 NOTE — PROGRESS NOTES
Chief Complaint   Patient presents with    ADHD     3 month        HPI:  Catalino Dumont is a 34 y.o. male     Follow up ADHD  Completed eval previously with Dr. Gloria Brown     Avoids the med on weekends/non-work days  Work slowing down so able to take more days off med     Able to focus  Less anxiety     Vyvanse has been good for him    Working, busy      Patient Active Problem List   Diagnosis    Thumb fracture    Fitting and adjustment of orthopedic device    Paroxysmal SVT (supraventricular tachycardia) ([facility])    Exertional headache    Severe right groin pain       Current Outpatient Medications   Medication Sig Dispense Refill    lisdexamfetamine (VYVANSE) 30 MG capsule Take 1 capsule by mouth every morning for 30 days. 30 capsule 0     No current facility-administered medications for this visit. Past Medical History:   Diagnosis Date    Anxiety     IBS (irritable bowel syndrome)     SVT (supraventricular tachycardia) ([address]) 5/2/2016    Thumb fracture     saw [facility]     History reviewed. No pertinent surgical history.   Family History   Problem Relation Age of Onset    Diabetes Maternal Grandmother     Diabetes Maternal Grandfather     Diabetes Paternal Grandmother     Diabetes Paternal Grandfather      Social History     Socioeconomic History    Marital status: Single     Spouse name: None    Number of children: None    Years of education: None    Highest education level: None   Tobacco Use    Smoking status: Never    Smokeless tobacco: Never   Substance and Sexual Activity    Alcohol use: No    Drug use: No     Social Determinants of Health     Financial Resource Strain: Low Risk     Difficulty of Paying Living Expenses: Not hard at all   Food Insecurity: No Food Insecurity    Worried About Running Out of Food in the Last Year: Never true    Ran Out of Food in the Last Year: Never true   Transportation Needs: No Transportation Needs    Lack of Transportation (Medical): No    Lack of Transportation

## 2023-07-28 DIAGNOSIS — F90.9 ATTENTION DEFICIT HYPERACTIVITY DISORDER (ADHD), UNSPECIFIED ADHD TYPE: ICD-10-CM

## 2023-08-30 DIAGNOSIS — F90.9 ATTENTION DEFICIT HYPERACTIVITY DISORDER (ADHD), UNSPECIFIED ADHD TYPE: ICD-10-CM

## 2023-08-30 NOTE — TELEPHONE ENCOUNTER
Requesting medication refill. Please approve or deny this request.    Rx requested:  Requested Prescriptions     Pending Prescriptions Disp Refills    lisdexamfetamine (VYVANSE) 30 MG capsule 30 capsule 0     Sig: Take 1 capsule by mouth every morning for 30 days. Last Office Visit:   7/10/2023    Next Visit Date:  No future appointments. Due 10/10/23  Message sent to schedule appt.

## 2023-11-17 DIAGNOSIS — F90.9 ATTENTION DEFICIT HYPERACTIVITY DISORDER (ADHD), UNSPECIFIED ADHD TYPE: ICD-10-CM

## 2023-11-17 RX ORDER — LISDEXAMFETAMINE DIMESYLATE CAPSULES 30 MG/1
30 CAPSULE ORAL EVERY MORNING
Qty: 30 CAPSULE | Refills: 0 | Status: SHIPPED | OUTPATIENT
Start: 2023-11-17 | End: 2023-12-17

## 2023-11-17 NOTE — TELEPHONE ENCOUNTER
----- Message from Kvng Duboissirena sent at 11/17/2023  9:06 AM EST -----  Subject: Refill Request    QUESTIONS  Name of Medication? lisdexamfetamine (VYVANSE) 30 MG capsule  Patient-reported dosage and instructions? one tablet daily   How many days do you have left? 0  Preferred Pharmacy? [id number]  Pharmacy phone number (if available)? [phone]  Additional Information for Provider? Next OV 12/08/23  ---------------------------------------------------------------------------  --------------  CALL BACK INFO  What is the best way for the office to contact you? OK to leave message on   voicemail  Preferred Call Back Phone Number? [phone]  ---------------------------------------------------------------------------  --------------  SCRIPT ANSWERS  Relationship to Patient?  Self

## 2023-12-08 ENCOUNTER — OFFICE VISIT (OUTPATIENT)
Dept: FAMILY MEDICINE CLINIC | Age: 28
End: 2023-12-08
Payer: COMMERCIAL

## 2023-12-08 VITALS
SYSTOLIC BLOOD PRESSURE: 122 MMHG | DIASTOLIC BLOOD PRESSURE: 80 MMHG | BODY MASS INDEX: 23.34 KG/M2 | TEMPERATURE: 98.1 F | HEART RATE: 102 BPM | WEIGHT: 163 LBS | HEIGHT: 70 IN | OXYGEN SATURATION: 98 %

## 2023-12-08 DIAGNOSIS — R53.83 OTHER FATIGUE: ICD-10-CM

## 2023-12-08 DIAGNOSIS — Z91.89 LACK OF MOTIVATION: ICD-10-CM

## 2023-12-08 DIAGNOSIS — R73.9 HYPERGLYCEMIA: ICD-10-CM

## 2023-12-08 DIAGNOSIS — Z11.4 SCREENING FOR HIV (HUMAN IMMUNODEFICIENCY VIRUS): ICD-10-CM

## 2023-12-08 DIAGNOSIS — R79.89 LOW TESTOSTERONE: Primary | ICD-10-CM

## 2023-12-08 DIAGNOSIS — Z11.59 ENCOUNTER FOR HEPATITIS C SCREENING TEST FOR LOW RISK PATIENT: ICD-10-CM

## 2023-12-08 DIAGNOSIS — F90.9 ATTENTION DEFICIT HYPERACTIVITY DISORDER (ADHD), UNSPECIFIED ADHD TYPE: Primary | ICD-10-CM

## 2023-12-08 LAB
ALBUMIN SERPL-MCNC: 4.9 G/DL (ref 3.5–4.6)
ALP SERPL-CCNC: 62 U/L (ref 35–104)
ALT SERPL-CCNC: 23 U/L (ref 0–41)
ANION GAP SERPL CALCULATED.3IONS-SCNC: 10 MEQ/L (ref 9–15)
AST SERPL-CCNC: 17 U/L (ref 0–40)
BILIRUB SERPL-MCNC: 0.4 MG/DL (ref 0.2–0.7)
CHLORIDE SERPL-SCNC: 102 MEQ/L (ref 95–107)
CO2 SERPL-SCNC: 27 MEQ/L (ref 20–31)
CREAT SERPL-MCNC: 0.94 MG/DL (ref 0.7–1.2)
ERYTHROCYTE [DISTWIDTH] IN BLOOD BY AUTOMATED COUNT: 12.3 % (ref 11.5–14.5)
GLOBULIN SER CALC-MCNC: 2.9 G/DL (ref 2.3–3.5)
GLUCOSE SERPL-MCNC: 92 MG/DL (ref 70–99)
HBA1C MFR BLD: 5.7 % (ref 4.8–5.9)
HCT VFR BLD AUTO: 49.7 % (ref 42–52)
HGB BLD-MCNC: 16.5 G/DL (ref 14–18)
MCH RBC QN AUTO: 30.1 PG (ref 27–31.3)
MCHC RBC AUTO-ENTMCNC: 33.2 % (ref 33–37)
MCV RBC AUTO: 90.5 FL (ref 79–92.2)
PLATELET # BLD AUTO: 371 K/UL (ref 130–400)
POTASSIUM SERPL-SCNC: 4.2 MEQ/L (ref 3.4–4.9)
PROT SERPL-MCNC: 7.8 G/DL (ref 6.3–8)
RBC # BLD AUTO: 5.49 M/UL (ref 4.7–6.1)
SODIUM SERPL-SCNC: 139 MEQ/L (ref 135–144)
TSH SERPL-MCNC: 2.42 UIU/ML (ref 0.44–3.86)
WBC # BLD AUTO: 6.9 K/UL (ref 4.8–10.8)

## 2023-12-08 PROCEDURE — 99214 OFFICE O/P EST MOD 30 MIN: CPT | Performed by: FAMILY MEDICINE

## 2023-12-08 NOTE — PROGRESS NOTES
Chief Complaint   Patient presents with    ADHD     Check up       HPI:  Brenda Walters is a 34 y.o. male     Follow up ADHD  Completed eval previously with Dr. Damari Arevalo     Avoids the med on weekends/non-work days  Work slowing down so able to take more days off med     Able to focus  Less anxiety     Vyvanse has been good for him    Working, busy    Fatigue, exhaustion, sleeping more   Vision out of focus  Occ sharp pains in ears   Low ambition  Feeling cold     Patient Active Problem List   Diagnosis    Thumb fracture    Fitting and adjustment of orthopedic device    Paroxysmal SVT (supraventricular tachycardia)    Exertional headache    Severe right groin pain       Current Outpatient Medications   Medication Sig Dispense Refill    lisdexamfetamine (VYVANSE) 30 MG capsule Take 1 capsule by mouth every morning for 30 days. 30 capsule 0     No current facility-administered medications for this visit. Past Medical History:   Diagnosis Date    Anxiety     IBS (irritable bowel syndrome)     SVT (supraventricular tachycardia) 5/2/2016    Thumb fracture     saw [facility]     History reviewed. No pertinent surgical history. Family History   Problem Relation Age of Onset    Diabetes Maternal Grandmother     Diabetes Maternal Grandfather     Diabetes Paternal Grandmother     Diabetes Paternal Grandfather      Social History     Socioeconomic History    Marital status: Single     Spouse name: None    Number of children: None    Years of education: None    Highest education level: None   Tobacco Use    Smoking status: Never    Smokeless tobacco: Never   Substance and Sexual Activity    Alcohol use: No    Drug use: No     Social Determinants of Health     Financial Resource Strain: Low Risk  (4/7/2023)    Overall Financial Resource Strain (CARDIA)     Difficulty of Paying Living Expenses: Not hard at all   Transportation Needs: Unknown (4/7/2023)    PRAPARE - Transportation     Lack of Transportation (Non-Medical):  No

## 2023-12-13 LAB
HEPATITIS C ANTIBODY: NONREACTIVE
HIV AG/AB: NONREACTIVE
SHBG SERPL-SCNC: 13 NMOL/L (ref 11–80)
TESTOST FREE SERPL-MCNC: 88.4 PG/ML (ref 47–244)
TESTOST SERPL-MCNC: 295 NG/DL (ref 220–1000)

## 2024-01-08 ENCOUNTER — NURSE ONLY (OUTPATIENT)
Dept: FAMILY MEDICINE CLINIC | Age: 29
End: 2024-01-08
Payer: COMMERCIAL

## 2024-01-08 DIAGNOSIS — E29.1 HYPOGONADISM IN MALE: Primary | ICD-10-CM

## 2024-01-08 PROCEDURE — 96372 THER/PROPH/DIAG INJ SC/IM: CPT | Performed by: FAMILY MEDICINE

## 2024-01-08 RX ORDER — TESTOSTERONE CYPIONATE 200 MG/ML
200 INJECTION, SOLUTION INTRAMUSCULAR
COMMUNITY
Start: 2024-01-08

## 2024-01-08 RX ORDER — TESTOSTERONE CYPIONATE 200 MG/ML
200 INJECTION, SOLUTION INTRAMUSCULAR ONCE
Status: COMPLETED | OUTPATIENT
Start: 2024-01-08 | End: 2024-01-08

## 2024-01-08 RX ADMIN — TESTOSTERONE CYPIONATE 200 MG: 200 INJECTION, SOLUTION INTRAMUSCULAR at 13:25

## 2024-01-08 NOTE — PROGRESS NOTES
Patient given Testosterone 200mg IM   Right gluteal..  Will return in 2 weeks for next injection    Patient tolerated injection well.    Administrations This Visit       testosterone cypionate (DEPOTESTOTERONE CYPIONATE) injection 200 mg       Admin Date  01/08/2024 Action  Given Dose  200 mg Route  IntraMUSCular Administered By  Pat Juárez LPN

## 2024-02-12 DIAGNOSIS — F90.9 ATTENTION DEFICIT HYPERACTIVITY DISORDER (ADHD), UNSPECIFIED ADHD TYPE: ICD-10-CM

## 2024-02-14 ENCOUNTER — TELEPHONE (OUTPATIENT)
Dept: FAMILY MEDICINE CLINIC | Age: 29
End: 2024-02-14

## 2024-02-14 RX ORDER — LISDEXAMFETAMINE DIMESYLATE CAPSULES 30 MG/1
30 CAPSULE ORAL EVERY MORNING
Qty: 30 CAPSULE | Refills: 0 | Status: SHIPPED | OUTPATIENT
Start: 2024-02-14 | End: 2024-03-15

## 2024-02-15 NOTE — TELEPHONE ENCOUNTER
Pt called and stated that the pharmacy told him that the price of the VYVANSE went up times three.    Pt wants to know if something else can be called in.    Pharmacy is [facility]  
Suggest trying other pharmacies to see if generic available   
Yes...

## 2024-10-07 NOTE — PROGRESS NOTES
[facility]                                 Pulmonary/Critical care  Consult /Progress Note :                                                                  CC :DKA  Patient is being seen at the request of Dr STANFORD  for a consultation for DKA     Subjective   Still nauseated but states t is better   Out of DKA  Using his pump  Start eating some   Off IVF    Lipase N       PHYSICAL EXAM:  Vitals:    10/07/24 0707   BP:    Pulse: 95   Resp:    Temp:    SpO2:      Gen: No distress.   Eyes: PERRL. No sclera icterus. No conjunctival injection.   ENT: No discharge. Pharynx clear.   Neck: Trachea midline. No obvious mass.    Resp: No accessory muscle use. No crackles. No wheezes. No rhonchi. No dullness on percussion.  CV: Regular rate. Regular rhythm. No murmur or rub. No edema. Peripheral pulses are 2+.  Capillary refill is less than 3 seconds.  GI: Non-tender. Non-distended. No hernia.   Skin: Warm and dry. No nodule on exposed extremities.   Lymph: No cervical LAD. No supraclavicular LAD.   M/S: No cyanosis. No joint deformity. No clubbing.   Neuro: Awake. Alert. Moves all four extremities.   Psych: Oriented x 3. No anxiety.     LABS:  CBC:   Recent Labs     10/05/24  0500 10/06/24  0614 10/07/24  0454   WBC 12.3* 10.8 8.6   HGB 12.3* 12.7* 12.2*   HCT 34.9* 36.2* 35.2*   MCV 90.5 90.6 90.3    202 211     BMP:   Recent Labs     10/04/24  1104 10/04/24  2323 10/05/24  0500 10/05/24  1126 10/06/24  2338 10/07/24  0454 10/07/24  0730      < > 142   < > 144 148* 145   K 3.4*   < > 3.9   < > 3.3* 3.2* 3.1*      < > 108   < > 107 110 107   CO2 25   < > 22   < > 27 26 28   PHOS 1.2*  --  2.3*  --   --   --   --    BUN 43*   < > 34*   < > 20 20 19   CREATININE 2.8*   < > 2.0*   < > 1.5* 1.3 1.4*    < > = values in this interval not displayed.     LIVER PROFILE:   No results for input(s): \"AST\", \"ALT\", \"LIPASE\", \"AMYLASE\",  Behavioral Health Consultation  Marilou Canavan, [address]. Psychologist  1/21/22  8:41 AM EST      Time spent with Patient: 30 minutes  This is patient's first  [facility] appointment. Reason for Consult:  stress and attention concerns  Referring Provider: Xi Mishra MD  [address] [facility],  [address] [address]    Pt provided informed consent for the behavioral health program. Discussed with patient model of service to include the limits of confidentiality (i.e. abuse reporting, suicide intervention, etc.) and short-term intervention focused approach. Pt indicated understanding. Feedback given to PCP. TELEHEALTH EVALUATION -- Audio and/or Visual (During NVMBK-17 public health emergency)    Due to COVID 19 outbreak, patient's office visit was converted to a virtual visit. Patient was contacted and agreed to proceed with a virtual visit via [employer] me. Patient reports that they are located at home. Provider located at home office. The risks and benefits of converting to a virtual visit were discussed in light of the current infectious disease epidemic. Patient also understood that insurance coverage and co-pays are up to their individual insurance plans. Patient provides verbal consent for this visit to be billed to insurance. Pursuant to the emergency declaration under the [facility] waiver authority and the [employer] General Act, this Virtual  Visit was conducted, with patient's consent, to reduce the patient's risk of exposure to COVID-19 and provide continuity of care for an established patient. Services were provided through an audio and video synchronous discussion virtually to substitute for in-person clinic visit.       S:  Presenting Concerns:  Pt reports that he feels he has been \"suffering silently\" and is \"never really present.'  States that he is \"always in my head, having racing thoughts all over the place.\"  States that it has affected his work and his relationships. He states he has a really hard time focusing on conversation and despite making an effort, will often still lose focus. States that he can't sit still; he has to get up every few minutes. Feels like \"I'm never actually there. \"  States he has tried several different things including exercise (it helps a little), Omega supplements (did not help), tried sleep interventions, uses sticky notes and lists (\"they're everywhere but then I don't look at them. \"). Pt states that he has tried to put himself on a strict routine in terms of sleep and waking but it does not really work; he can't fall asleep at the same time. Pt notes that in childhood he was \"the kid who was always goofing off. \"  States \"I did terrible in school up until college. Could never sit down and do homework. \"  Reports that all of family says he was very hyper. He acknowledges that he was \"terrible at listening. \"   States that he was easily distracted during tests. Recalls that he was unable to complete the ACT because of this. States that he tried to bring this to his mom's attention but she was not in favor of him taking stimulant medication. Pt reports that he started having issues with anxiety as an adolescent. He tried Zoloft, but only for a few weeks. Pt is a banker and life . He attended [employer] but did not graduate yet. He stopped attending in Fall 2018; he would like to go back at some point as he only has one class left to complete. He is living with his girlfriend; they have been together for a couple years. Notes that his attention issues seem to be affecting his relationship. Denies SI.              History:        Diagnosis Date    Anxiety     IBS (irritable bowel syndrome)     SVT (supraventricular tachycardia) ([facility] [address] 5/2/2016    Thumb fracture     saw [facility]           Medications:   Current Outpatient Medications   Medication Sig Dispense Refill    atomoxetine (STRATTERA) 40 MG capsule Take 1 capsule by mouth 2 times daily 60 capsule 3    dicyclomine (BENTYL) 10 MG capsule Take 1 capsule by mouth 3 times daily as needed (intestinal cramp) 90 capsule 3    hydrocortisone (ANUSOL-HC) 2.5 % CREA rectal cream Apply to rectal area BID 28 g 0     No current facility-administered medications for this visit. Social History:   Social History     Socioeconomic History    Marital status: Single     Spouse name: Not on file    Number of children: Not on file    Years of education: Not on file    Highest education level: Not on file   Occupational History    Not on file   Tobacco Use    Smoking status: Never Smoker    Smokeless tobacco: Never Used   Substance and Sexual Activity    Alcohol use: No    Drug use: No    Sexual activity: Not on file   Other Topics Concern    Not on file   Social History Narrative    Not on file     Social Determinants of Health     Financial Resource Strain: Low Risk     Difficulty of Paying Living Expenses: Not hard at all   Food Insecurity: No Food Insecurity    Worried About [address] [employer] in the Last Year: Never true    [place] of Food in the Last Year: Never true   Transportation Needs: No Transportation Needs    Lack of Transportation (Medical): No    Lack of Transportation (Non-Medical):  No   Physical Activity:     Days of Exercise per Week: Not on file    Minutes of Exercise per Session: Not on file   Stress:     Feeling of Stress : Not on file   Social Connections:     Frequency of Communication with Friends and Family: Not on file    Frequency of Social Gatherings with Friends and Family: Not on file    Attends Tenriism Services: Not on file    Active Member of Clubs or Organizations: Not on file    Attends Club or Organization Meetings: Not on file    Marital Status: Not on file   Intimate Partner Violence:     Fear of Current or Ex-Partner: Not on file   [employer] Abused: Not on file    Physically Abused: Not on file    Sexually Abused: Not on file   Housing Stability:     Unable to Pay for Housing in the Last Year: Not on file    Number of Places Lived in the Last Year: Not on file    Unstable Housing in the Last Year: Not on file         Family History:   Family History   Problem Relation Age of Onset    Diabetes Maternal Grandmother     Diabetes Maternal Grandfather     Diabetes Paternal Grandmother     Diabetes Paternal Grandfather        TOBACCO:   reports that he has never smoked. He has never used smokeless tobacco.  ETOH:   reports no history of alcohol use. O:  MSE:    Appearance    alert, cooperative   Personal Hygiene : appropriately dressed, appropriately groomed and healthy looking  Appetite abnormal  Sleep disturbance Yes, including: frequent night time awakening and difficulty falling asleep  Fatigue Yes  Loss of pleasure Yes  Impulsive behavior No  Speech    spontaneous, normal rate and normal volume  Mood   neutral   Affect    normal affect  Thought Content    intact  Thought Process    linear, goal directed and coherent  Associations    logical connections  Insight    fair  Judgment    good  Orientation    oriented to person, place, time, and general circumstances  Memory    recent and remote memory intact  Attention/Concentration    Intact during this encounter, but pt reports impairment  Morbid ideation No  Suicide Assessment    no suicidal ideation        A:  Pt is given a diagnosis of Generalized Anxiety Disorder. Reports frequent worry, \"always\" feeling anxious or on edge, difficulty stopping or controlling worry, worry about many different things, restlessness, insomnia,irritability, trouble relaxing, difficulty concentrating. Further assessment needed to determine presence of any additional diagnoses. Pt does endorse anhedonia nearly all the time, but it is unclear if this is related to depression or a primary attention disorder. Pt endorses depressed mood \"sometimes. \"              PHQ Scores 1/21/2022 6/4/2021/4/2021 4/25/2017   PHQ2 Score 4 0 0   PHQ9 Score 17 0 0     Interpretation of Total Score Depression Severity: 1-4 = Minimal depression, 5-9 = Mild depression, 10-14 = Moderate depression, 15-19 = Moderately severe depression, 20-27 = Severe depression    Administered the PHQ9 which indicates a self report of moderately severe symptom distress. ALFREDA 7 SCORE 1/21/2022   ALFREDA-7 Total Score 18     Interpretation of ALFREDA-7 score: 5-9 = mild anxiety, 10-14 = moderate anxiety, 15+ = severe anxiety. Recommend referral to behavioral health for scores 10 or greater. Administered the ALFREDA-7 which indicates a self report of severe anxiety. Pt would benefit from [facility] services to increase coping skills to provide symptom management/control/relief. Diagnosis:    Generalized anxiety disorder    R/O MDD  R/O ADHD    Plan:  Pt interventions:  Established rapport, Conducted functional assessment, Supportive techniques and Provided Psychoeducation re: assessment process        Pt Behavioral Change Plan:  Complete questionnaires (patient will  from office)  Follow up in 1 week to continue assessment    Please note this report has been partially produced using speech recognition software and may cause contain errors related to that system including grammar, punctuation and spelling as well as words and phrases that may seem inappropriate. If there are questions or concerns please feel free to contact me to clarify.

## 2024-12-23 SDOH — ECONOMIC STABILITY: FOOD INSECURITY: WITHIN THE PAST 12 MONTHS, THE FOOD YOU BOUGHT JUST DIDN'T LAST AND YOU DIDN'T HAVE MONEY TO GET MORE.: NEVER TRUE

## 2024-12-23 SDOH — ECONOMIC STABILITY: INCOME INSECURITY: HOW HARD IS IT FOR YOU TO PAY FOR THE VERY BASICS LIKE FOOD, HOUSING, MEDICAL CARE, AND HEATING?: NOT VERY HARD

## 2024-12-23 SDOH — ECONOMIC STABILITY: FOOD INSECURITY: WITHIN THE PAST 12 MONTHS, YOU WORRIED THAT YOUR FOOD WOULD RUN OUT BEFORE YOU GOT MONEY TO BUY MORE.: NEVER TRUE

## 2024-12-26 ENCOUNTER — OFFICE VISIT (OUTPATIENT)
Dept: FAMILY MEDICINE CLINIC | Age: 29
End: 2024-12-26
Payer: MEDICAID

## 2024-12-26 VITALS
HEART RATE: 89 BPM | DIASTOLIC BLOOD PRESSURE: 75 MMHG | SYSTOLIC BLOOD PRESSURE: 120 MMHG | BODY MASS INDEX: 26.37 KG/M2 | HEIGHT: 70 IN | WEIGHT: 184.2 LBS | OXYGEN SATURATION: 98 % | TEMPERATURE: 99 F

## 2024-12-26 DIAGNOSIS — M54.9 UPPER BACK PAIN: ICD-10-CM

## 2024-12-26 DIAGNOSIS — F90.9 ATTENTION DEFICIT HYPERACTIVITY DISORDER (ADHD), UNSPECIFIED ADHD TYPE: ICD-10-CM

## 2024-12-26 DIAGNOSIS — E29.1 HYPOGONADISM IN MALE: Primary | ICD-10-CM

## 2024-12-26 PROCEDURE — 99214 OFFICE O/P EST MOD 30 MIN: CPT | Performed by: FAMILY MEDICINE

## 2024-12-26 RX ORDER — LISDEXAMFETAMINE DIMESYLATE 30 MG/1
30 CAPSULE ORAL EVERY MORNING
Qty: 30 CAPSULE | Refills: 0 | Status: SHIPPED | OUTPATIENT
Start: 2024-12-26 | End: 2025-01-25

## 2024-12-26 RX ORDER — CYCLOBENZAPRINE HCL 10 MG
10 TABLET ORAL 3 TIMES DAILY PRN
Qty: 30 TABLET | Refills: 0 | Status: SHIPPED | OUTPATIENT
Start: 2024-12-26 | End: 2025-01-05

## 2024-12-26 RX ORDER — METHYLPREDNISOLONE 4 MG/1
TABLET ORAL
Qty: 1 KIT | Refills: 0 | Status: SHIPPED | OUTPATIENT
Start: 2024-12-26

## 2024-12-26 NOTE — PROGRESS NOTES
Chief Complaint   Patient presents with    Back Pain     Pain in back, x 2 weeks, middle back     Discuss Labs     Testosterone        HPI:  Jensen Rollins is a 29 y.o. male     Follow up ADHD  Completed eval previously with Dr. Marie     Back in school     Able to focus  Less anxiety     Vyvanse in past    Was without insurance     Working, busy    Middle back pain    Gained some weight    Wt Readings from Last 3 Encounters:   12/26/24 83.6 kg (184 lb 3.2 oz)   12/08/23 73.9 kg (163 lb)   07/10/23 69.9 kg (154 lb 3.2 oz)         Patient Active Problem List   Diagnosis    Thumb fracture    Fitting and adjustment of orthopedic device    Paroxysmal SVT (supraventricular tachycardia) (HCC)    Exertional headache    Severe right groin pain       Current Outpatient Medications   Medication Sig Dispense Refill    lisdexamfetamine (VYVANSE) 30 MG capsule Take 1 capsule by mouth every morning for 30 days. 30 capsule 0    methylPREDNISolone (MEDROL DOSEPACK) 4 MG tablet Take by mouth. 1 kit 0    cyclobenzaprine (FLEXERIL) 10 MG tablet Take 1 tablet by mouth 3 times daily as needed for Muscle spasms 30 tablet 0     No current facility-administered medications for this visit.         Past Medical History:   Diagnosis Date    Anxiety     IBS (irritable bowel syndrome)     SVT (supraventricular tachycardia) (HCC) 5/2/2016    Thumb fracture     saw [facility]     History reviewed. No pertinent surgical history.  Family History   Problem Relation Age of Onset    Diabetes Maternal Grandmother     Diabetes Maternal Grandfather     Diabetes Paternal Grandmother     Diabetes Paternal Grandfather      Social History     Socioeconomic History    Marital status: Single     Spouse name: None    Number of children: None    Years of education: None    Highest education level: None   Tobacco Use    Smoking status: Never    Smokeless tobacco: Never   Substance and Sexual Activity    Alcohol use: No    Drug use: No     Social Determinants of

## 2024-12-27 DIAGNOSIS — F90.9 ATTENTION DEFICIT HYPERACTIVITY DISORDER (ADHD), UNSPECIFIED ADHD TYPE: ICD-10-CM

## 2024-12-27 DIAGNOSIS — Z91.89 LACK OF MOTIVATION: ICD-10-CM

## 2024-12-27 DIAGNOSIS — E29.1 HYPOGONADISM IN MALE: ICD-10-CM

## 2024-12-27 DIAGNOSIS — R53.83 OTHER FATIGUE: ICD-10-CM

## 2024-12-27 LAB
ERYTHROCYTE [DISTWIDTH] IN BLOOD BY AUTOMATED COUNT: 12.5 % (ref 11.5–14.5)
HCT VFR BLD AUTO: 51.5 % (ref 42–52)
HGB BLD-MCNC: 17.2 G/DL (ref 14–18)
MCH RBC QN AUTO: 30.2 PG (ref 27–31.3)
MCHC RBC AUTO-ENTMCNC: 33.4 % (ref 33–37)
MCV RBC AUTO: 90.5 FL (ref 79–92.2)
PLATELET # BLD AUTO: 359 K/UL (ref 130–400)
PROLACTIN SERPL-MCNC: 14.9 NG/ML (ref 4–15.2)
PSA SERPL-MCNC: 0.55 NG/ML (ref 0–4)
RBC # BLD AUTO: 5.69 M/UL (ref 4.7–6.1)
TSH SERPL-MCNC: 1.87 UIU/ML (ref 0.44–3.86)
WBC # BLD AUTO: 7.2 K/UL (ref 4.8–10.8)

## 2024-12-28 LAB
CORTISOL COLLECTION INFO: NORMAL
CORTISOL: 14.1 UG/DL (ref 2.5–19.5)
SHBG SERPL-SCNC: 10 NMOL/L (ref 17–56)
TESTOST FREE SERPL-MCNC: 103.6 PG/ML (ref 47–244)
TESTOST SERPL-MCNC: 319 NG/DL (ref 249–836)

## 2025-01-06 ENCOUNTER — TELEPHONE (OUTPATIENT)
Dept: FAMILY MEDICINE CLINIC | Age: 30
End: 2025-01-06

## 2025-01-06 DIAGNOSIS — E29.1 HYPOGONADISM IN MALE: Primary | ICD-10-CM

## 2025-01-06 NOTE — TELEPHONE ENCOUNTER
Testosterone slightly low  Could start shots.  Would need to self-administer or have friend/partner give shot in buttock every 2 weeks.    Or could try androgel

## 2025-01-07 RX ORDER — TESTOSTERONE CYPIONATE 200 MG/ML
200 INJECTION, SOLUTION INTRAMUSCULAR
Qty: 6 ML | Refills: 1 | Status: SHIPPED | OUTPATIENT
Start: 2025-01-07 | End: 2025-07-06

## 2025-01-07 NOTE — TELEPHONE ENCOUNTER
Pt calling to get the testosterone medication/syringes sent over to [facility] in [place]. If you need to speak with him, you can call him @ [phone]

## 2025-01-07 NOTE — TELEPHONE ENCOUNTER
Diagnosis Orders   1. Hypogonadism in male  testosterone cypionate (DEPOTESTOTERONE CYPIONATE) 200 MG/ML injection    SYRINGE-NEEDLE, DISP, 3 ML (LUER LOCK SAFETY SYRINGES) 22G X 1\" 3 ML MISC

## 2025-02-14 ENCOUNTER — OFFICE VISIT (OUTPATIENT)
Dept: FAMILY MEDICINE CLINIC | Age: 30
End: 2025-02-14
Payer: COMMERCIAL

## 2025-02-14 VITALS
SYSTOLIC BLOOD PRESSURE: 120 MMHG | OXYGEN SATURATION: 98 % | HEART RATE: 85 BPM | BODY MASS INDEX: 26.74 KG/M2 | HEIGHT: 70 IN | WEIGHT: 186.8 LBS | DIASTOLIC BLOOD PRESSURE: 80 MMHG | TEMPERATURE: 98.9 F

## 2025-02-14 DIAGNOSIS — M62.838 NECK MUSCLE SPASM: ICD-10-CM

## 2025-02-14 DIAGNOSIS — M54.2 NECK PAIN: Primary | ICD-10-CM

## 2025-02-14 PROCEDURE — 99213 OFFICE O/P EST LOW 20 MIN: CPT | Performed by: FAMILY MEDICINE

## 2025-02-14 PROCEDURE — G8427 DOCREV CUR MEDS BY ELIG CLIN: HCPCS | Performed by: FAMILY MEDICINE

## 2025-02-14 PROCEDURE — G8419 CALC BMI OUT NRM PARAM NOF/U: HCPCS | Performed by: FAMILY MEDICINE

## 2025-02-14 PROCEDURE — 1036F TOBACCO NON-USER: CPT | Performed by: FAMILY MEDICINE

## 2025-02-14 RX ORDER — METHYLPREDNISOLONE 4 MG/1
TABLET ORAL
Qty: 1 KIT | Refills: 0 | Status: SHIPPED | OUTPATIENT
Start: 2025-02-14

## 2025-02-14 SDOH — ECONOMIC STABILITY: FOOD INSECURITY: WITHIN THE PAST 12 MONTHS, THE FOOD YOU BOUGHT JUST DIDN'T LAST AND YOU DIDN'T HAVE MONEY TO GET MORE.: NEVER TRUE

## 2025-02-14 SDOH — ECONOMIC STABILITY: FOOD INSECURITY: WITHIN THE PAST 12 MONTHS, YOU WORRIED THAT YOUR FOOD WOULD RUN OUT BEFORE YOU GOT MONEY TO BUY MORE.: NEVER TRUE

## 2025-02-14 ASSESSMENT — PATIENT HEALTH QUESTIONNAIRE - PHQ9
SUM OF ALL RESPONSES TO PHQ9 QUESTIONS 1 & 2: 0
1. LITTLE INTEREST OR PLEASURE IN DOING THINGS: NOT AT ALL
SUM OF ALL RESPONSES TO PHQ QUESTIONS 1-9: 0
2. FEELING DOWN, DEPRESSED OR HOPELESS: NOT AT ALL
SUM OF ALL RESPONSES TO PHQ QUESTIONS 1-9: 0

## 2025-02-14 NOTE — PROGRESS NOTES
Chief Complaint   Patient presents with    Neck Injury     Hurt neck 4 months, past 2-3 weeks having twitching on left side of body only, neck still bothering him       HPI:  Jensen Rollins is a 30 y.o. male     Neck pain, eyelid twitch    Spasm  Some limited ROM    Able to focus  Less anxiety     Vyvanse in past    Was without insurance     Working, busy    Middle back pain    Gained some weight    Wt Readings from Last 3 Encounters:   02/14/25 84.7 kg (186 lb 12.8 oz)   12/26/24 83.6 kg (184 lb 3.2 oz)   12/08/23 73.9 kg (163 lb)         Patient Active Problem List   Diagnosis    Thumb fracture    Fitting and adjustment of orthopedic device    Paroxysmal SVT (supraventricular tachycardia) (HCC)    Exertional headache    Severe right groin pain       Current Outpatient Medications   Medication Sig Dispense Refill    tiZANidine (ZANAFLEX) 4 MG tablet Take 1 tablet by mouth 3 times daily as needed (spasm) 30 tablet 0    methylPREDNISolone (MEDROL DOSEPACK) 4 MG tablet Take by mouth. 1 kit 0    testosterone cypionate (DEPOTESTOTERONE CYPIONATE) 200 MG/ML injection Inject 1 mL into the muscle every 14 days for 180 days. Max Daily Amount: 200 mg 6 mL 1    SYRINGE-NEEDLE, DISP, 3 ML (LUER LOCK SAFETY SYRINGES) 22G X 1\" 3 ML MISC 1 each by Does not apply route every 14 days 50 each 0    lisdexamfetamine (VYVANSE) 30 MG capsule Take 1 capsule by mouth every morning for 30 days. 30 capsule 0     No current facility-administered medications for this visit.         Past Medical History:   Diagnosis Date    Anxiety     IBS (irritable bowel syndrome)     SVT (supraventricular tachycardia) (HCC) 5/2/2016    Thumb fracture     saw [facility]     History reviewed. No pertinent surgical history.  Family History   Problem Relation Age of Onset    Diabetes Maternal Grandmother     Diabetes Maternal Grandfather     Diabetes Paternal Grandmother     Diabetes Paternal Grandfather      Social History     Socioeconomic History    Marital

## 2025-02-21 ENCOUNTER — HOSPITAL ENCOUNTER (OUTPATIENT)
Dept: PHYSICAL THERAPY | Age: 30
Setting detail: THERAPIES SERIES
Discharge: HOME | End: 2025-02-21
Payer: COMMERCIAL

## 2025-02-21 PROCEDURE — 97161 PT EVAL LOW COMPLEX 20 MIN: CPT

## 2025-02-21 PROCEDURE — 97140 MANUAL THERAPY 1/> REGIONS: CPT

## 2025-02-21 NOTE — PLAN OF CARE
Physical Therapy Evaluation/Plan of Care   [facility] [place] [facility] - PT  [address]  [place] [address]  Dept: [phone]  Dept Fax: [phone]  Loc: [phone]    Physical Therapy: Initial Evaluation    General Information    Patient: Jensen Rollins (30 y.o.     male)   Examination Date: 2025   :  1995 ;    Confirmed: Yes MRN: [id number]  CSN: [id number]   Insurance: Payor: [employer] / Plan: [employer] PLAN / Product Type: *No Product type* /   Insurance ID: [id number] - (Medicaid Managed)    Secondary Insurance (if applicable):     Referring Physician: Peter Neil MD       Visits to Date/Visits Approved:     No Show/Cancelled Appts: 0 / 0     Medical Diagnosis: Neck pain [M54.2]  Neck muscle spasm [M62.838]        Treatment Diagnosis: pain, decreased cervical AROM, LUE strength, posture, joint mobility, increased headaches, TTP L levator scap, rectus capitus      SUBJECTIVE:     Onset date: 10/15/2024       Subjective/ Mechanism of Injury: Pt is a 29 y/o M who presents w/ neck pain for the last 4 months with c/o \"twitching\" in his L eye. He reports getting a tension headache in his head if he sits and reads for too long.  Pt feels and increase in pain with cervical extension and turning to his L. Pain can radiate to L shoulder blade with sustained movements.  Pain is sharp with sudden.  Pain feels better with sleep.        Precautions/Contraindications/Restrictions: none                Interventions for current problem: massage, chiropractor, ice, heat , rest , stretch      Hand Dominance: Right    Dizziness: no  Double Vision: no  Changes in Speech: no  Changes in Swallowing: no  Loss of consciousness/Drop Attacks: no  Numbness: no  Nausea: no  Tinnitus: yes  Generalized Weakness: no    Pain:   Current: 0/10   Best: 0/10  Worst:7/10 (occurs with sudden cervical movements).     Symptom Type / Quality: aching,

## 2025-03-04 DIAGNOSIS — F90.9 ATTENTION DEFICIT HYPERACTIVITY DISORDER (ADHD), UNSPECIFIED ADHD TYPE: ICD-10-CM

## 2025-03-04 NOTE — TELEPHONE ENCOUNTER
Comments: Refill     Last Office Visit (last PCP visit):   2/14/2025     Next Visit Date:    Future Appointments   Date Time Provider Department Center   3/6/2025  4:20 PM Abiel iLma, PTA [facility]   3/14/2025  3:00 PM Lm Davis, PT [facility]   3/21/2025  3:00 PM Lm Davis, PT [facility]   3/28/2025  3:00 PM Lm Davis, PT [facility]   4/4/2025  3:00 PM Lm Davis, PT [facility]   4/11/2025  3:00 PM Lm Davis, PT [facility]        **If hasn't been seen in over a year OR hasn't followed up according to last diabetes/ADHD visit, make appointment for patient before sending refill to provider.     Rx requested:    Requested Prescriptions     Pending Prescriptions Disp Refills    lisdexamfetamine (VYVANSE) 30 MG capsule 30 capsule 0     Sig: Take 1 capsule by mouth every morning for 30 days.

## 2025-03-05 RX ORDER — LISDEXAMFETAMINE DIMESYLATE 30 MG/1
30 CAPSULE ORAL EVERY MORNING
Qty: 30 CAPSULE | Refills: 0 | Status: SHIPPED | OUTPATIENT
Start: 2025-03-05 | End: 2025-04-04

## 2025-03-06 ENCOUNTER — HOSPITAL ENCOUNTER (OUTPATIENT)
Dept: PHYSICAL THERAPY | Age: 30
Setting detail: THERAPIES SERIES
Discharge: HOME | End: 2025-03-06
Payer: COMMERCIAL

## 2025-03-06 PROCEDURE — 97110 THERAPEUTIC EXERCISES: CPT

## 2025-03-06 PROCEDURE — 97140 MANUAL THERAPY 1/> REGIONS: CPT

## 2025-03-06 ASSESSMENT — PAIN DESCRIPTION - ORIENTATION: ORIENTATION: LEFT

## 2025-03-06 ASSESSMENT — PAIN SCALES - GENERAL: PAINLEVEL_OUTOF10: 4

## 2025-03-06 ASSESSMENT — PAIN DESCRIPTION - LOCATION: LOCATION: NECK

## 2025-03-06 ASSESSMENT — PAIN DESCRIPTION - DESCRIPTORS: DESCRIPTORS: TIGHTNESS;ACHING

## 2025-03-06 NOTE — PROGRESS NOTES
manual, Mechanical Traction  Pt to continue current HEP.  See objective section for any therapeutic exercise changes, additions or modifications this date.    Therapy Time:      PT Individual Minutes  Time In: 1620  Time Out: 1659  Minutes: 39  Timed Code Treatment Minutes: 39 Minutes  Procedure Minutes: 0  Timed Activity Minutes Units   Ther Ex 31 2   Manual  8 1   Electronically signed by Abiel Lima PTA on 3/6/25 at 5:01 PM EST

## 2025-03-14 ENCOUNTER — HOSPITAL ENCOUNTER (OUTPATIENT)
Dept: PHYSICAL THERAPY | Age: 30
Setting detail: THERAPIES SERIES
Discharge: HOME | End: 2025-03-14
Payer: COMMERCIAL

## 2025-03-14 PROCEDURE — 97110 THERAPEUTIC EXERCISES: CPT

## 2025-03-14 NOTE — PROGRESS NOTES
increased headaches, TTP L levator scap, rectus capitus  Therapy Prognosis: Good       Patient Education: [x] NA       Post-Pain Assessment:       Pain Rating (0-10 pain scale):   0/10   Location and pain description same as pre-treatment unless indicated.   Action: [] NA   [] Perform HEP  [] Meds as prescribed  [] Modalities as prescribed   [] Call Physician     GOALS   Patient Goal(s): Patient Goals : Figure out the problem and find a way to fix it    Long Term Goals Completed by 4 weeks Goal Status   LTG 1 The pt will have decreased pain </= 1/10 w/ cervical sudden and sustained movements in order to increase ease with ADL's In progress   LTG 2 The pt will demonstrate improved L UE periscapular strength 5/5 and L ER strength to 5/5 in order to lift/carry with decreased pain and improve posture In progress   LTG 3 The pt will demo improved cervical LR, L SB, and extension pain-free AROM in order to increase ease of ADL's Met   LTG 4 The pt will have a decrease in NDI score >/=10 points in order to increase functional activity tolerance In progress   LTG 5 The pt will be independent/compliant with PT HEP in order to demonstrate self management of symptoms upon D/C In progress   LTG 6 Pt will report >/= 95% daily function subjectively or via functional activity survey. In progress          Plan:  Frequency/Duration:  Plan  Plan Frequency: 1-2  Plan weeks: 4  Current Treatment Recommendations: Strengthening, ROM, Neuromuscular re-education, Manual, Home exercise program, Safety education & training, Patient/Caregiver education & training, Equipment evaluation, education, & procurement, Modalities, Therapeutic activities  Modalities: Heat/Cold, E-stim - unattended, E-stim - manual, Mechanical Traction  Pt to continue current HEP.  See objective section for any therapeutic exercise changes, additions or modifications this date.    Therapy Time:      PT Individual Minutes  Time In: 1458  Time Out: 1536  Minutes:

## 2025-03-21 ENCOUNTER — HOSPITAL ENCOUNTER (OUTPATIENT)
Dept: PHYSICAL THERAPY | Age: 30
Setting detail: THERAPIES SERIES
Discharge: HOME | End: 2025-03-21
Payer: COMMERCIAL

## 2025-03-21 NOTE — PROGRESS NOTES
Therapy                            Cancellation/No-show Note      Date: 2025  Patient: Jensen Rollins (30 y.o. male)  : 1995  MRN:  [id number]  Referring Physician: Peter Neil MD    Medical Diagnosis: Neck pain [M54.2]  Neck muscle spasm [M62.838]      Visit Information:  Visits to Date 3   No Show/Cancelled Appts: 0 / 1      For today's appointment patient:  [x]  Cancelled  []  Rescheduled appointment  []  No-show   []  Called pt to remind of next appointment     Reason given by patient:  []  Patient ill  []  Conflicting appointment  []  No transportation    [x]  Conflict with work  []  No reason given  []  Other:      [x] Pt has future appointments scheduled, no follow up needed  [] Pt requests to be on hold.    Reason:   If > 2 weeks please discuss with therapist.  [] Therapist to call pt for follow up  [] [place] to call to re-schedule      Comments:       Signature: Electronically signed by Lm Davis PT on 3/21/25 at 12:37 PM EDT

## 2025-03-27 NOTE — PROGRESS NOTES
Therapy                            Cancellation/No-show Note      Date: 2025  Patient: Jensen Rollins (30 y.o. male)  : 1995  MRN:  [id number]  Referring Physician: Peter Neil MD    Medical Diagnosis: Neck pain [M54.2]  Neck muscle spasm [M62.838]      Visit Information:  Visits to Date 3   No Show/Cancelled Appts: 0 / 2      For today's appointment patient:  [x]  Cancelled  []  Rescheduled appointment  []  No-show   []  Called pt to remind of next appointment     Reason given by patient:  []  Patient ill  []  Conflicting appointment  []  No transportation    []  Conflict with work  []  No reason given  [x]  Other:  D/C per PCP- wants him to see specialist prior to continuing PT     [] Pt has future appointments scheduled, no follow up needed  [] Pt requests to be on hold.    Reason:   If > 2 weeks please discuss with therapist.  [] Therapist to call pt for follow up  [] [place] to call to re-schedule      Comments:       Signature: Electronically signed by Brittanie Doran PT on 3/27/25 at 5:07 PM EDT

## 2025-03-28 ENCOUNTER — HOSPITAL ENCOUNTER (OUTPATIENT)
Dept: PHYSICAL THERAPY | Age: 30
Setting detail: THERAPIES SERIES
Discharge: HOME | End: 2025-03-28
Payer: COMMERCIAL

## 2025-09-01 ENCOUNTER — OFFICE VISIT (OUTPATIENT)
Dept: URGENT CARE | Age: 30
End: 2025-09-01
Payer: COMMERCIAL

## 2025-09-01 VITALS
SYSTOLIC BLOOD PRESSURE: 121 MMHG | BODY MASS INDEX: 24.62 KG/M2 | RESPIRATION RATE: 16 BRPM | HEART RATE: 94 BPM | HEIGHT: 70 IN | OXYGEN SATURATION: 96 % | DIASTOLIC BLOOD PRESSURE: 78 MMHG | TEMPERATURE: 97.9 F | WEIGHT: 172 LBS

## 2025-09-01 DIAGNOSIS — J06.9 UPPER RESPIRATORY TRACT INFECTION, UNSPECIFIED TYPE: Primary | ICD-10-CM

## 2025-09-01 PROCEDURE — 99203 OFFICE O/P NEW LOW 30 MIN: CPT | Performed by: NURSE PRACTITIONER

## 2025-09-01 PROCEDURE — 1036F TOBACCO NON-USER: CPT | Performed by: NURSE PRACTITIONER

## 2025-09-01 PROCEDURE — 3008F BODY MASS INDEX DOCD: CPT | Performed by: NURSE PRACTITIONER

## 2025-09-01 RX ORDER — AZITHROMYCIN 250 MG/1
TABLET, FILM COATED ORAL
Qty: 6 TABLET | Refills: 0 | Status: SHIPPED | OUTPATIENT
Start: 2025-09-01 | End: 2025-09-06

## 2025-09-01 ASSESSMENT — ENCOUNTER SYMPTOMS
GASTROINTESTINAL NEGATIVE: 1
PSYCHIATRIC NEGATIVE: 1
EYES NEGATIVE: 1
MUSCULOSKELETAL NEGATIVE: 1
PALPITATIONS: 0
NEUROLOGICAL NEGATIVE: 1
ALLERGIC/IMMUNOLOGIC NEGATIVE: 1
ENDOCRINE NEGATIVE: 1
HEMATOLOGIC/LYMPHATIC NEGATIVE: 1
COUGH: 1
CONSTITUTIONAL NEGATIVE: 1